# Patient Record
Sex: FEMALE | Race: WHITE | NOT HISPANIC OR LATINO | Employment: OTHER | URBAN - METROPOLITAN AREA
[De-identification: names, ages, dates, MRNs, and addresses within clinical notes are randomized per-mention and may not be internally consistent; named-entity substitution may affect disease eponyms.]

---

## 2017-07-07 ENCOUNTER — ALLSCRIPTS OFFICE VISIT (OUTPATIENT)
Dept: OTHER | Facility: OTHER | Age: 57
End: 2017-07-07

## 2017-07-07 ENCOUNTER — GENERIC CONVERSION - ENCOUNTER (OUTPATIENT)
Dept: OTHER | Facility: OTHER | Age: 57
End: 2017-07-07

## 2017-07-07 DIAGNOSIS — Z12.39 ENCOUNTER FOR OTHER SCREENING FOR MALIGNANT NEOPLASM OF BREAST: ICD-10-CM

## 2017-07-07 DIAGNOSIS — N39.3 STRESS INCONTINENCE: ICD-10-CM

## 2017-08-04 ENCOUNTER — ALLSCRIPTS OFFICE VISIT (OUTPATIENT)
Dept: OTHER | Facility: OTHER | Age: 57
End: 2017-08-04

## 2017-08-04 PROCEDURE — G0145 SCR C/V CYTO,THINLAYER,RESCR: HCPCS | Performed by: NURSE PRACTITIONER

## 2017-08-07 ENCOUNTER — LAB REQUISITION (OUTPATIENT)
Dept: LAB | Facility: HOSPITAL | Age: 57
End: 2017-08-07
Payer: COMMERCIAL

## 2017-08-07 DIAGNOSIS — Z12.4 ENCOUNTER FOR SCREENING FOR MALIGNANT NEOPLASM OF CERVIX: ICD-10-CM

## 2017-08-10 LAB
LAB AP GYN PRIMARY INTERPRETATION: NORMAL
Lab: NORMAL

## 2017-08-11 ENCOUNTER — GENERIC CONVERSION - ENCOUNTER (OUTPATIENT)
Dept: OTHER | Facility: OTHER | Age: 57
End: 2017-08-11

## 2017-09-06 ENCOUNTER — APPOINTMENT (OUTPATIENT)
Dept: PHYSICAL THERAPY | Facility: REHABILITATION | Age: 57
End: 2017-09-06
Payer: COMMERCIAL

## 2017-09-06 ENCOUNTER — GENERIC CONVERSION - ENCOUNTER (OUTPATIENT)
Dept: OTHER | Facility: OTHER | Age: 57
End: 2017-09-06

## 2017-09-06 PROCEDURE — 97530 THERAPEUTIC ACTIVITIES: CPT

## 2017-09-06 PROCEDURE — 97162 PT EVAL MOD COMPLEX 30 MIN: CPT

## 2017-09-19 ENCOUNTER — APPOINTMENT (OUTPATIENT)
Dept: PHYSICAL THERAPY | Facility: REHABILITATION | Age: 57
End: 2017-09-19
Payer: COMMERCIAL

## 2017-09-19 PROCEDURE — 97112 NEUROMUSCULAR REEDUCATION: CPT

## 2017-11-08 ENCOUNTER — GENERIC CONVERSION - ENCOUNTER (OUTPATIENT)
Dept: OTHER | Facility: OTHER | Age: 57
End: 2017-11-08

## 2018-01-12 VITALS
WEIGHT: 148 LBS | DIASTOLIC BLOOD PRESSURE: 82 MMHG | HEIGHT: 68 IN | SYSTOLIC BLOOD PRESSURE: 122 MMHG | BODY MASS INDEX: 22.43 KG/M2

## 2018-01-13 VITALS
SYSTOLIC BLOOD PRESSURE: 106 MMHG | WEIGHT: 149.4 LBS | BODY MASS INDEX: 23.45 KG/M2 | HEIGHT: 67 IN | DIASTOLIC BLOOD PRESSURE: 78 MMHG

## 2018-01-14 VITALS
DIASTOLIC BLOOD PRESSURE: 82 MMHG | WEIGHT: 148.8 LBS | HEIGHT: 68 IN | BODY MASS INDEX: 22.55 KG/M2 | SYSTOLIC BLOOD PRESSURE: 122 MMHG

## 2018-01-16 NOTE — RESULT NOTES
Verified Results  (1) THIN PREP PAP WITH IMAGING [id number] 12:22PM Kerrie Bamberger     Test Name Result Flag Reference   LAB AP CASE REPORT (Report)     Gynecologic Cytology Report            Case: [id number]                  Authorizing Provider: AUDRA Yin   Collected:      08/04/2017 1222        First Screen:     HUGO Melgar    Received:      08/07/2017 1222        Specimen:  LIQUID-BASED PAP, SCREENING, Cervix   LAB AP GYN PRIMARY INTERPRETATION      Negative for intraepithelial lesion or malignancy  Electronically signed by HUGO Melgar on 8/10/2017 at 11:14 AM   LAB AP GYN SPECIMEN ADEQUACY      Satisfactory for evaluation  Endocervical/transformation zone component present  LAB AP GYN ADDITIONAL INFORMATION (Report)     tok tok tok's FDA approved ,  and ThinPrep Imaging System are   utilized with strict adherence to the 's instruction manual to   prepare gynecologic and non-gynecologic cytology specimens for the   production of ThinPrep slides as well as for gynecologic ThinPrep imaging  These processes have been validated by our laboratory and/or by the     The Pap test is not a diagnostic procedure and should not be used as the   sole means to detect cervical cancer  It is only a screening procedure to   aid in the detection of cervical cancer and its precursors  Both   false-negative and false-positive results have been experienced  Your   patient's test result should be interpreted in this context together with   the history and clinical findings     LAB AP LMP not given     not given

## 2018-08-13 ENCOUNTER — ANNUAL EXAM (OUTPATIENT)
Dept: GYNECOLOGY | Facility: CLINIC | Age: 58
End: 2018-08-13
Payer: COMMERCIAL

## 2018-08-13 VITALS
WEIGHT: 146.2 LBS | BODY MASS INDEX: 22.95 KG/M2 | HEART RATE: 62 BPM | HEIGHT: 67 IN | SYSTOLIC BLOOD PRESSURE: 122 MMHG | RESPIRATION RATE: 16 BRPM | DIASTOLIC BLOOD PRESSURE: 84 MMHG

## 2018-08-13 DIAGNOSIS — Z01.411 ENCNTR FOR GYN EXAM (GENERAL) (ROUTINE) W ABNORMAL FINDINGS: Primary | ICD-10-CM

## 2018-08-13 DIAGNOSIS — Z12.31 ENCOUNTER FOR SCREENING MAMMOGRAM FOR BREAST CANCER: ICD-10-CM

## 2018-08-13 DIAGNOSIS — N90.7 VULVAR CYST: ICD-10-CM

## 2018-08-13 DIAGNOSIS — D22.9 NEVUS OF MULTIPLE SITES: ICD-10-CM

## 2018-08-13 PROCEDURE — S0612 ANNUAL GYNECOLOGICAL EXAMINA: HCPCS | Performed by: NURSE PRACTITIONER

## 2018-08-13 RX ORDER — MONTELUKAST SODIUM 10 MG/1
10 TABLET ORAL
COMMUNITY
Start: 2018-07-24 | End: 2019-01-18

## 2018-08-13 RX ORDER — CETIRIZINE HYDROCHLORIDE 10 MG/1
10 TABLET ORAL
COMMUNITY

## 2018-08-13 RX ORDER — BUDESONIDE AND FORMOTEROL FUMARATE DIHYDRATE 160; 4.5 UG/1; UG/1
AEROSOL RESPIRATORY (INHALATION)
COMMUNITY
Start: 2018-07-20 | End: 2020-08-25 | Stop reason: SDUPTHER

## 2018-08-13 RX ORDER — FLUTICASONE PROPIONATE 50 MCG
SPRAY, SUSPENSION (ML) NASAL
COMMUNITY
Start: 2018-07-24 | End: 2019-01-18

## 2018-08-13 NOTE — PROGRESS NOTES
Assessment/Plan:     Calcium 1200-1500mg + 600-1000 IU Vit D daily  Pap with HR HPV q 3 years-due 2020  Annual mammogram Colonoscopy- currently due-will call and schedule  Monthly BSE  Exercise 150 minutes per week minimum  Follow up with Dr Ebonie Burkett for skin check  Call with any worsening of left vulvar cyst  Tub soak with epsom salt daily x 1 week  Warm soaks  Diagnoses and all orders for this visit:    Encntr for gyn exam (general) (routine) w abnormal findings    Vulvar cyst    Encounter for screening mammogram for breast cancer  -     Mammo screening bilateral w 3d & cad; Future    Other orders  -     VENTOLIN  (90 Base) MCG/ACT inhaler;   -     SYMBICORT 160-4 5 MCG/ACT inhaler;   -     cetirizine (ZyrTEC) 10 mg tablet; Take 10 mg by mouth  -     fluticasone (FLONASE) 50 mcg/act nasal spray;   -     montelukast (SINGULAIR) 10 mg tablet; Take 10 mg by mouth              Subjective:      Patient ID: Benson Belle is a 56 y o  female  Benson Belle is a 56 y o  female who is here today for her annual visit  Concerned about a "cyst" that she noticed in her left groin about 2 weeks ago  It has decreased in size over these weeks  Tender with tough and hard  LMP 2 years ago  No vaginal bleeding  Benson Belle is occasionally sexually active with male partner of 28 years  Monogamous  Sex is slightly less comfortable   has HTN and taking a diruretic  Hx of asthma-typically is stable  No meds needed currently  Exercises 2-4 times per week  They just got a new puppy since they lost their dog recently  Attended PF PT which helped with urinary incontinence  Resolved currently  The following portions of the patient's history were reviewed and updated as appropriate: allergies, current medications, past family history, past medical history, past social history, past surgical history and problem list     Review of Systems   Constitutional: Negative    Negative for activity change, appetite change, chills, diaphoresis, fatigue, fever and unexpected weight change  HENT: Negative for congestion, dental problem, sneezing, sore throat and trouble swallowing  Eyes: Negative for visual disturbance  Respiratory: Negative for chest tightness and shortness of breath  Cardiovascular: Negative for chest pain and leg swelling  Gastrointestinal: Negative for abdominal pain, constipation, diarrhea, nausea and vomiting  Genitourinary: Positive for dyspareunia  Negative for difficulty urinating, dysuria, frequency, hematuria, menstrual problem, pelvic pain, urgency, vaginal bleeding, vaginal discharge and vaginal pain  Musculoskeletal: Negative for back pain and neck pain  Skin: Negative  Allergic/Immunologic: Negative  Neurological: Negative for weakness and headaches  Hematological: Negative for adenopathy  Psychiatric/Behavioral: Negative  Objective:      /84 (BP Location: Right arm, Patient Position: Sitting, Cuff Size: Standard)   Pulse 62   Resp 16   Ht 5' 7" (1 702 m)   Wt 66 3 kg (146 lb 3 2 oz)   BMI 22 90 kg/m²          Physical Exam   Constitutional: She is oriented to person, place, and time  She appears well-developed and well-nourished  HENT:   Head: Normocephalic and atraumatic  Eyes: Right eye exhibits no discharge  Left eye exhibits no discharge  Neck: Normal range of motion  Neck supple  Cardiovascular: Normal rate, regular rhythm, normal heart sounds and intact distal pulses  Pulmonary/Chest: Effort normal and breath sounds normal    Abdominal: Soft  Genitourinary: Vagina normal and uterus normal  Rectal exam shows no external hemorrhoid  No breast swelling, tenderness, discharge or bleeding  No labial fusion  There is no rash, tenderness, lesion or injury on the right labia  There is no rash, tenderness, lesion or injury on the left labia  Cervix exhibits no motion tenderness, no discharge and no friability   Right adnexum displays no mass, no tenderness and no fullness  Left adnexum displays no mass, no tenderness and no fullness  No erythema, tenderness or bleeding in the vagina  No foreign body in the vagina  No signs of injury around the vagina  No vaginal discharge found  Genitourinary Comments: Slight vaginal atrophy     Musculoskeletal: Normal range of motion  Lymphadenopathy:     She has no cervical adenopathy  Right: No inguinal adenopathy present  Left: No inguinal adenopathy present  Neurological: She is alert and oriented to person, place, and time  Skin: Skin is warm and dry  Raised 2 tone elevated nevus on right inner thigh  Slighty raised tan nevus on left labia majora  <1 cm firm nodule with slight erythema top of left labia majora   Psychiatric: She has a normal mood and affect  Nursing note and vitals reviewed

## 2018-08-13 NOTE — PATIENT INSTRUCTIONS
Calcium 1200-1500mg + 600-1000 IU Vit D daily  Pap with HR HPV q 3 years-due 2020  Annual mammogram Colonoscopy-done at age 50, currently due-will call and schedule  Monthly BSE  Exercise 150 minutes per week minimum  Kegels 20 times twice daily  Silicone based lubricant with sex  Follow up with Dr Abel Pereira for skin check  Call with any worsening of left vulvar cyst  Tub soak with epsom salt daily x 1 week  Warm soaks

## 2019-01-14 ENCOUNTER — NURSE TRIAGE (OUTPATIENT)
Dept: PHYSICAL THERAPY | Facility: OTHER | Age: 59
End: 2019-01-14

## 2019-01-14 DIAGNOSIS — G89.29 CHRONIC RIGHT-SIDED LOW BACK PAIN WITH RIGHT-SIDED SCIATICA: Primary | ICD-10-CM

## 2019-01-14 DIAGNOSIS — M54.41 CHRONIC RIGHT-SIDED LOW BACK PAIN WITH RIGHT-SIDED SCIATICA: Primary | ICD-10-CM

## 2019-01-14 NOTE — TELEPHONE ENCOUNTER
Reason for Disposition   Is this a chronic condition? Background - Initial Assessment  Clinical complaint: sciatica pain when standing of my right side, down to calf and into foot  Date of onset: about 1 yr ago  Mechanism of injury: unk    Previous Treatment - Previous Treatment  Previous evaluation: none  Current provider: PCP  Diagnostics: none  Previous treatment: none    Protocols used: SL AMB COMPREHENSIVE SPINE PROGRAM PROTOCOL    RN explained that Comprehensive Spine program is physical therapy based program in which patients are scheduled for a consultation  The therapists may offer treatments such as exercises, stretches, posturing, massage or traction  RN also explained that physiatry is physical medicine and rehabilitation in which the physician assistant would conduct a full exam and if needed, order additional tests / place referrals  PMR treats a wide variety of medical conditions affecting the brain, spinal cord, nerves, bones, joints, ligaments, muscles, and tendons  RN recommended Physical therapy and Physiatry consults for which the pt agreed  RN submitted referrals for PTx Guero Kennedy) and PM&R(REBECCA Brooks)  RN provided both therapy  and [address] [place] [address] Road with pt contact info

## 2019-01-15 NOTE — PROGRESS NOTES
Assessment/Plan:      Diagnoses and all orders for this visit:    Chronic right-sided low back pain with right-sided sciatica  -     XR spine lumbar minimum 4 views non injury; Future    Piriformis syndrome of right side    Pain of right sacroiliac joint    Other orders  -     fluticasone (FLONASE) 50 mcg/act nasal spray; instill 1 spray into each nostril daily  -     albuterol (VENTOLIN HFA) 90 mcg/act inhaler; Inhale 2 puffs  -     Ascorbic Acid (VITAMIN C) 1000 MG tablet; Take 1,000 mg by mouth daily  -     Cholecalciferol (VITAMIN D3) 2000 units capsule; Take 2,000 Units by mouth daily  -     Flaxseed, Linseed, (FLAX SEEDS PO); Take by mouth  -     calcium carbonate (OS-CONCHITA) 600 MG tablet; Take 600 mg by mouth 2 (two) times a day with meals      Discussion: 67 yo female presenting for chronic LBP with sciatica  Will order XR of lumbar spine as she does have a hx of osteopenia as well as lumbar facet margin tenderness  Advised to start physical therapy when able as ordered by Comprehensive Spine Program focusing on SIJ and piriformis mobilization  Will follow up with patient after completion of therapy for re-evaluation  Discussed if no relief from therapy to consider further imaging or possible referral for injection therapy  Recommend she continue OTC NSAIDs with food as needed for pain relief in addition to heat, and OTC topicals  Subjective:     Patient ID: Kayden Martinez is a 66 y o  female  HPI Referral from [facility] Program for chronic right sided sciatica  Denies any previous imaging or treatments  LBP and leg pain for the past few years  Describes as a constant aching pain with radiation into lateral calf in an L5 distribution  Sciatica exacerbated with standing, but is a constant issue  Denies any N/T, weakness, B/B incontinence/retention, saddle anesthesia  Pain is not as significant with walking  Using BenGay with temporary  Utilizing Advil as needed for relief  Lanette Johnston RN    1/14/19 11:57 AM   Note      Reason for Disposition   Is this a chronic condition? Background - Initial Assessment  Clinical complaint: sciatica pain when standing of my right side, down to calf and into foot  Date of onset: about 1 yr ago  Mechanism of injury: unk    Previous Treatment - Previous Treatment  Previous evaluation: none  Current provider: PCP  Diagnostics: none  Previous treatment: none    Protocols used: SL AMB COMPREHENSIVE SPINE PROGRAM PROTOCOL     RN explained that Comprehensive Spine program is physical therapy based program in which patients are scheduled for a consultation  The therapists may offer treatments such as exercises, stretches, posturing, massage or traction        RN also explained that physiatry is physical medicine and rehabilitation in which the physician assistant would conduct a full exam and if needed, order additional tests / place referrals  PMR treats a wide variety of medical conditions affecting the brain, spinal cord, nerves, bones, joints, ligaments, muscles, and tendons        RN recommended Physical therapy and Physiatry consults for which the pt agreed       RN submitted referrals for PTx Raghu Arnold) and PM&R(REBECCA HENDRICKS)       RN provided both therapy  and [address] [place] [address] with pt contact info  PAST MEDICAL HISTORY  Past Medical History:   Diagnosis Date    Asthma      PAST SURGICAL HISTORY  Past Surgical History:   Procedure Laterality Date    WISDOM TOOTH EXTRACTION         FAMILY HISTORY  Family History   Problem Relation Age of Onset    Adopted:  Yes    No Known Problems Son     No Known Problems Son      HOME MEDICATIONS  Current Outpatient Prescriptions   Medication Sig Dispense Refill    albuterol (VENTOLIN HFA) 90 mcg/act inhaler Inhale 2 puffs      Ascorbic Acid (VITAMIN C) 1000 MG tablet Take 1,000 mg by mouth daily      calcium carbonate (OS-CONCHITA) 600 MG tablet Take 600 mg by mouth 2 (two) times a day with meals      cetirizine (ZyrTEC) 10 mg tablet Take 10 mg by mouth      Cholecalciferol (VITAMIN D3) 2000 units capsule Take 2,000 Units by mouth daily      Flaxseed, Linseed, (FLAX SEEDS PO) Take by mouth      fluticasone (FLONASE) 50 mcg/act nasal spray instill 1 spray into each nostril daily      SYMBICORT 160-4 5 MCG/ACT inhaler        No current facility-administered medications for this visit  ALLERGIES  Eggs or egg-derived products; Paroxetine; and Tetracycline      SOCIAL HISTORY  History   Alcohol Use    Yes     Comment: Occasionally     History   Drug Use No     History   Smoking Status    Never Smoker   Smokeless Tobacco    Never Used     Review of Systems   Constitutional: Negative for chills, diaphoresis, fever and unexpected weight change  Respiratory: Positive for shortness of breath  Negative for wheezing  Cardiovascular: Negative for chest pain and palpitations  Gastrointestinal: Negative for constipation  Genitourinary: Negative for difficulty urinating  Musculoskeletal: Positive for back pain  Neurological: Negative for weakness and numbness  Psychiatric/Behavioral: Positive for sleep disturbance  Objective:  Vitals:    01/18/19 1403   BP: 116/88   Pulse: 64        Physical Exam   Constitutional: She is oriented to person, place, and time  She appears well-developed and well-nourished  No distress  HENT:   Head: Normocephalic and atraumatic  Neck: Normal range of motion  Neck supple  Musculoskeletal:        Lumbar back: She exhibits decreased range of motion, tenderness and bony tenderness  Significant TTP right SIJ and piriformis     Piriformis palpation right with increased left calf sensation    Mild TTP lumbar facet margins, paraspinals, ITB    Noted hip flexor tightness  + Romeo's right into hip    Neurological: She is alert and oriented to person, place, and time  She has normal strength  She displays no atrophy   No cranial nerve deficit or sensory deficit  She exhibits normal muscle tone  Coordination and gait normal    Reflex Scores:       Tricep reflexes are 2+ on the right side and 2+ on the left side  Bicep reflexes are 2+ on the right side and 2+ on the left side  Brachioradialis reflexes are 2+ on the right side and 2+ on the left side  Patellar reflexes are 2+ on the right side and 2+ on the left side  Achilles reflexes are 2+ on the right side and 2+ on the left side   - SLR bilaterally     Able to heel walk, toe walk, knee bend without difficulty   Skin: Skin is warm and dry  No rash noted  She is not diaphoretic  Psychiatric: She has a normal mood and affect  Her behavior is normal  Judgment and thought content normal    Vitals reviewed

## 2019-01-18 ENCOUNTER — OFFICE VISIT (OUTPATIENT)
Dept: PAIN MEDICINE | Facility: CLINIC | Age: 59
End: 2019-01-18
Payer: COMMERCIAL

## 2019-01-18 VITALS
HEART RATE: 64 BPM | BODY MASS INDEX: 25.43 KG/M2 | WEIGHT: 162 LBS | HEIGHT: 67 IN | SYSTOLIC BLOOD PRESSURE: 116 MMHG | DIASTOLIC BLOOD PRESSURE: 88 MMHG

## 2019-01-18 DIAGNOSIS — M54.41 CHRONIC RIGHT-SIDED LOW BACK PAIN WITH RIGHT-SIDED SCIATICA: Primary | ICD-10-CM

## 2019-01-18 DIAGNOSIS — M53.3 PAIN OF RIGHT SACROILIAC JOINT: ICD-10-CM

## 2019-01-18 DIAGNOSIS — G89.29 CHRONIC RIGHT-SIDED LOW BACK PAIN WITH RIGHT-SIDED SCIATICA: Primary | ICD-10-CM

## 2019-01-18 DIAGNOSIS — G57.01 PIRIFORMIS SYNDROME OF RIGHT SIDE: ICD-10-CM

## 2019-01-18 PROCEDURE — 99204 OFFICE O/P NEW MOD 45 MIN: CPT | Performed by: PHYSICIAN ASSISTANT

## 2019-01-18 RX ORDER — PHENOL 1.4 %
600 AEROSOL, SPRAY (ML) MUCOUS MEMBRANE 2 TIMES DAILY WITH MEALS
COMMUNITY

## 2019-01-18 RX ORDER — ACETAMINOPHEN 160 MG
2000 TABLET,DISINTEGRATING ORAL DAILY
COMMUNITY

## 2019-01-18 RX ORDER — ALBUTEROL SULFATE 90 UG/1
2 AEROSOL, METERED RESPIRATORY (INHALATION)
COMMUNITY
Start: 2018-11-13 | End: 2019-11-13

## 2019-01-18 RX ORDER — MULTIVIT WITH MINERALS/LUTEIN
1000 TABLET ORAL DAILY
COMMUNITY

## 2019-01-18 RX ORDER — FLUTICASONE PROPIONATE 50 MCG
SPRAY, SUSPENSION (ML) NASAL
COMMUNITY
Start: 2018-10-29 | End: 2020-08-25 | Stop reason: DRUGHIGH

## 2019-01-28 ENCOUNTER — EVALUATION (OUTPATIENT)
Dept: PHYSICAL THERAPY | Age: 59
End: 2019-01-28
Payer: COMMERCIAL

## 2019-01-28 DIAGNOSIS — M54.41 CHRONIC RIGHT-SIDED LOW BACK PAIN WITH RIGHT-SIDED SCIATICA: Primary | ICD-10-CM

## 2019-01-28 DIAGNOSIS — G89.29 CHRONIC RIGHT-SIDED LOW BACK PAIN WITH RIGHT-SIDED SCIATICA: Primary | ICD-10-CM

## 2019-01-28 PROCEDURE — 97161 PT EVAL LOW COMPLEX 20 MIN: CPT | Performed by: PHYSICAL THERAPIST

## 2019-01-28 PROCEDURE — 97140 MANUAL THERAPY 1/> REGIONS: CPT | Performed by: PHYSICAL THERAPIST

## 2019-01-28 PROCEDURE — 97110 THERAPEUTIC EXERCISES: CPT | Performed by: PHYSICAL THERAPIST

## 2019-01-28 NOTE — PROGRESS NOTES
PT Evaluation     Today's date: 2019  Patient name: Sallie Whitmore  : 1960  MRN: [id number]  Referring provider: Joseph Segundo MD  Dx:   Encounter Diagnosis     ICD-10-CM    1  Chronic right-sided low back pain with right-sided sciatica M54 41     G89 29        Start Time: 1610  Stop Time: 1700  Total time in clinic (min): 50 minutes    Assessment  Assessment details: Sallie Whitmore is a 66 y o  female who presents with complaints of Chronic right-sided low back pain with right-sided sciatica  (primary encounter diagnosis)  No further referral appears necessary at this time based upon examination results  She is presenting with core weakness, decreased gluteal strength, SI joint dysfunction leading to nerve irritation  She is limited with sitting and standing for durations and will benefit from treatment to address functional deficits  Prognosis is good given HEP compliance and PT 2-3x/wk  Positive prognostic indicators include positive attitude toward recovery  Please contact me if you have any questions or recommendations  Thank you for the opportunity to share in  [facility]       Impairments: abnormal muscle firing, abnormal muscle tone, abnormal or restricted ROM, abnormal movement, impaired physical strength, lacks appropriate home exercise program, pain with function and poor posture     Symptom irritability: lowUnderstanding of Dx/Px/POC: good   Prognosis: good    Plan  Patient would benefit from: skilled physical therapy  Planned modality interventions: thermotherapy: hydrocollator packs  Planned therapy interventions: joint mobilization, manual therapy, patient education, postural training, strengthening, stretching, therapeutic activities, therapeutic exercise, home exercise program, graded motor, graded exercise, neuromuscular re-education, abdominal trunk stabilization and functional ROM exercises  Frequency: 2x week  Duration in weeks: 5  Plan of Care beginning date: 2019  Plan of Care expiration date: 3/4/2019  Treatment plan discussed with: patient        Subjective Evaluation    History of Present Illness  Mechanism of injury: Patient reports chronic symptoms that come and go at times and has been present for a couple months  Symptoms have been worsening at this time and standing for long periods of time have been worsening  Reports prior history or Right sided sciatic symptoms  Sitting will improve symptoms if present once standing  Symptoms start at right PSIS into right gluteal down posterior aspect of right calf  Does not extend into foot  Symptoms described as tight and achy, no sharp shooting pain, nor numbness/tingling  No bowel/bladder dysfunction, nor saddle paraesthesias, nor cough/sneeze provocation  She reports that some vehicles will bother her while driving  Reports that she feels worse when driving compared to sitting as passenger  Occasional discomfort when rolling over in bed as well as with transitioning into and out of car  Denies pain with stair navigation nor with rising from seated position  5-10 minutes comfortably standing before needing to sit down  Recurrent probem    Quality of life: good    Pain  Current pain rating: 3  At best pain ratin  At worst pain ratin  Quality: dull ache  Aggravating factors: standing and walking    Treatments  No previous or current treatments  Patient Goals  Patient goals for therapy: independence with ADLs/IADLs and decreased pain  Patient goal: improve standing/walking/sitting tolerance        Objective    Flowsheet Rows      Most Recent Value   PT/OT G-Codes   Current Score  65   Projected Score  74       GAIT: unremarkable  Squat assess: slight left lean  ¼ squat assess:B/L genu valgum    Lumbar  % of normal   Flex  75%p! Extn  75%p! SB Left 100%   SB Right 75%p! ROT Left 100%   ROT Right 100%   Repetitive testing: extension= better    Flexion= no change      MMT    Hip       L       R   Flex   5 5 Extn  5 4   Abd  5 4   Add  5 5   IR  5 4   ER  4 4-        G  Max 4- 3+   G  Med  3+ 3-                 MMT    Knee         L        R   Flex  5 5   Extn  5 5                     MMT    Ankle       L        R   PF 5 5   DF  5 5   EHL 5 5   Inv  5 5     Palpation: TTP sacrotuberous lig , piriformis, sciatic nerve  Myotomes (L/R): intact  Dermatome: (pinprick- L/R):  intact     Reflexes:  (L/R) L4: 2+ BLE       S1: 2+ BLE            Babinski=   -       Slump test: L= -    R=    Pain in back with neck flexion   Straight leg raise:   L=   -   R=   +             Transverse abdominis: Bent knee fall out= Fair supine march=Poor       Hip/SI joint:   scour=  -     juarez = -    capsular mobility=  -       long axis distraction (hip) =  -       obers=   -       gurjit test= -       Active SLR=   -        Active SLR with stabilization =  -     Leg length =  -    Sacral Thrust=  + compression=  +   [place] scan=   + 4/4  FAIR: -   Innominate position= right anterior rotated  Hamstring dominance test=    +      Hip abd/lat rot  = +        prone knee flexion= -  Multifidus activation = fair  Joint mobility: decreased L4-5          Sacral position: unremarkable    Precautions: none    Daily Treatment Diary   Manual 1/28       Gr  5 right AI correction FB                                                         Exercise Diary 1/28       Stand glute set with hip ER 2x10, 5"       R SKTC 10x5"       R clam shell 2x10, 5"       TA BKFO 2x10                                                 Patient provided verbal consent to treatment plan and recommended interventions  Modalities                          Short Term:  1  Pt will report decreased levels of pain by at least 2 subjective ratings in 4 weeks  2  Pt will demonstrate improved ROM by at least 10 degrees in 4 weeks  3  Pt will demonstrate improved strength by 1/2 grade in 4 weeks  4  Pt will be able to stand > 20 minutes in 4 weeks  Long Term:   1   Pt will be independent in their HEP in 8 weeks  2  Pt will be pain free with ADLs  3  Pt will demonstrate improved FOTO, > 9 in 8 weeks  4  Pt will be able to sit > 1 hour without pain in 8 weeks

## 2019-01-30 ENCOUNTER — APPOINTMENT (OUTPATIENT)
Dept: PHYSICAL THERAPY | Age: 59
End: 2019-01-30
Payer: COMMERCIAL

## 2019-02-04 ENCOUNTER — OFFICE VISIT (OUTPATIENT)
Dept: PHYSICAL THERAPY | Age: 59
End: 2019-02-04
Payer: COMMERCIAL

## 2019-02-04 ENCOUNTER — APPOINTMENT (OUTPATIENT)
Dept: PHYSICAL THERAPY | Age: 59
End: 2019-02-04
Payer: COMMERCIAL

## 2019-02-04 DIAGNOSIS — M54.41 CHRONIC RIGHT-SIDED LOW BACK PAIN WITH RIGHT-SIDED SCIATICA: Primary | ICD-10-CM

## 2019-02-04 DIAGNOSIS — G89.29 CHRONIC RIGHT-SIDED LOW BACK PAIN WITH RIGHT-SIDED SCIATICA: Primary | ICD-10-CM

## 2019-02-04 PROCEDURE — 97112 NEUROMUSCULAR REEDUCATION: CPT | Performed by: PHYSICAL THERAPIST

## 2019-02-04 PROCEDURE — 97140 MANUAL THERAPY 1/> REGIONS: CPT | Performed by: PHYSICAL THERAPIST

## 2019-02-04 PROCEDURE — 97110 THERAPEUTIC EXERCISES: CPT | Performed by: PHYSICAL THERAPIST

## 2019-02-04 NOTE — PROGRESS NOTES
Daily Note     Today's date: 2019  Patient name: Maya Bullock  : 1960  MRN: [id number]  Referring provider: Jyoti Kimbrough MD  Dx:   Encounter Diagnosis     ICD-10-CM    1  Chronic right-sided low back pain with right-sided sciatica M54 41     G89 29        Start Time: 1715  Stop Time: 1805  Total time in clinic (min): 50 minutes    Subjective: Patient reports that she has been feeling much better overall  Still having discomfort in gluteal region and down her leg but has noticed a difference since initial evaluation  Objective: See treatment diary below      Assessment: Tolerated treatment well  Patient demonstrated improved gluteal activation and improved neural mobility  Plan: Continue per plan of care       Precautions: none     Daily Treatment Diary   Manual  2/4         Gr  5 right AI correction FB  np          L5-S1 gapping with sciatic n  tensioners   FB          Right Sciatic n  tensioners (DF/PF)   3x10                                                                    Exercise Diary   2/4         Stand glute set with hip ER 2x10, 5"  HEP         R SKTC 10x5"  HEP         R clam shell 2x10, 5"  2x10, 5"         TA BKFO 2x10  2x10, with cuff          TA iso    10x10" with cuff          prone heel squeeze   5" hold, 2 min  total          prone press up    2x10          lateal heel tap  1x10, 4"          sciatic tensioners PF/DF seated   2x10

## 2019-02-05 ENCOUNTER — APPOINTMENT (OUTPATIENT)
Dept: PHYSICAL THERAPY | Age: 59
End: 2019-02-05
Payer: COMMERCIAL

## 2019-02-07 ENCOUNTER — APPOINTMENT (OUTPATIENT)
Dept: PHYSICAL THERAPY | Age: 59
End: 2019-02-07
Payer: COMMERCIAL

## 2019-08-15 ENCOUNTER — ANNUAL EXAM (OUTPATIENT)
Dept: GYNECOLOGY | Facility: CLINIC | Age: 59
End: 2019-08-15
Payer: COMMERCIAL

## 2019-08-15 VITALS
WEIGHT: 146.4 LBS | SYSTOLIC BLOOD PRESSURE: 124 MMHG | DIASTOLIC BLOOD PRESSURE: 78 MMHG | HEIGHT: 67 IN | BODY MASS INDEX: 22.98 KG/M2

## 2019-08-15 DIAGNOSIS — N95.2 VAGINAL ATROPHY: ICD-10-CM

## 2019-08-15 DIAGNOSIS — Z01.411 ENCNTR FOR GYN EXAM (GENERAL) (ROUTINE) W ABNORMAL FINDINGS: Primary | ICD-10-CM

## 2019-08-15 DIAGNOSIS — Z12.31 ENCOUNTER FOR SCREENING MAMMOGRAM FOR BREAST CANCER: ICD-10-CM

## 2019-08-15 PROCEDURE — S0612 ANNUAL GYNECOLOGICAL EXAMINA: HCPCS | Performed by: NURSE PRACTITIONER

## 2019-08-15 RX ORDER — LEVOTHYROXINE SODIUM 0.03 MG/1
TABLET ORAL
Refills: 0 | COMMUNITY
Start: 2019-08-10 | End: 2020-08-25 | Stop reason: SDUPTHER

## 2019-08-15 RX ORDER — ESTRADIOL 0.1 MG/G
CREAM VAGINAL
Qty: 42.5 G | Refills: 1 | Status: SHIPPED | OUTPATIENT
Start: 2019-08-15 | End: 2019-08-21

## 2019-08-15 NOTE — PATIENT INSTRUCTIONS
Calcium 1200-1500mg + 600-1000 IU Vit D daily  Pap with HR HPV Q 5 years-due 2020  Annual mammogram  Colonoscopy-up to date  Monthly BSE  Exercise 150 minutes per week minimum  Kegels 20 times twice daily  Silicone based lubricant with sex  Vaginal estrogen as discussed  RTO in 6 months for follow up

## 2019-08-15 NOTE — PROGRESS NOTES
Assessment/Plan:     Calcium 1200-1500mg + 600-1000 IU Vit D daily  Pap with HR HPV Q 5 years-due 2020  Annual mammogram  Colonoscopy-up to date  Monthly BSE  Exercise 150 minutes per week minimum  Kegels 20 times twice daily  Silicone based lubricant with sex  Vaginal estrogen as discussed  RTO in 6 months for follow up  Negative depression screen  Diagnoses and all orders for this visit:    Encntr for gyn exam (general) (routine) w abnormal findings    Vaginal atrophy  -     estradiol (ESTRACE) 0 1 mg/g vaginal cream; Use 0 5 gm daily x 2 weeks then 0 5 gm twice weekly    Encounter for screening mammogram for breast cancer  -     Mammo screening bilateral w 3d & cad; Future    Other orders  -     levothyroxine 25 mcg tablet              Subjective:      Patient ID: Esme Hope is a 66 y o  female  Esme Hope is a 66 y o  female who is here today for her annual visit  No health concerns  LMP 3 years ago  No vaginal bleeding  Esme Hope is not sexually active with male partner of 29 years  Last coitus was uncomfortable  Normal pap 8/17  Has a new grandson born in 4/19  Another baby due 11/19  All living local  Started on levothyroxine last year for hypothyroidism  Exercising 4 times per week  Asthma is stable  The following portions of the patient's history were reviewed and updated as appropriate: allergies, current medications, past family history, past medical history, past social history, past surgical history and problem list     Review of Systems   Constitutional: Negative  Negative for activity change, appetite change, chills, diaphoresis, fatigue, fever and unexpected weight change  HENT: Negative for congestion, dental problem, sneezing, sore throat and trouble swallowing  Eyes: Negative for visual disturbance  Respiratory: Negative for chest tightness and shortness of breath  Cardiovascular: Negative for chest pain and leg swelling     Gastrointestinal: Negative for abdominal pain, constipation, diarrhea, nausea and vomiting  Genitourinary: Positive for dyspareunia  Negative for difficulty urinating, dysuria, frequency, hematuria, pelvic pain, urgency, vaginal bleeding, vaginal discharge and vaginal pain  Musculoskeletal: Negative for back pain and neck pain  Skin: Negative  Allergic/Immunologic: Negative  Neurological: Negative for weakness and headaches  Hematological: Negative for adenopathy  Psychiatric/Behavioral: Negative  Objective:      /78 (BP Location: Left arm, Patient Position: Sitting, Cuff Size: Standard)   Ht 5' 7" (1 702 m)   Wt 66 4 kg (146 lb 6 4 oz)   LMP  (LMP Unknown)   BMI 22 93 kg/m²          Physical Exam   Constitutional: She is oriented to person, place, and time  Vital signs are normal  She appears well-developed and well-nourished  HENT:   Head: Normocephalic and atraumatic  Eyes: Right eye exhibits no discharge  Left eye exhibits no discharge  Neck: Trachea normal and normal range of motion  Neck supple  No thyromegaly present  Cardiovascular: Normal rate, regular rhythm, normal heart sounds and intact distal pulses  Pulmonary/Chest: Effort normal and breath sounds normal  Right breast exhibits no inverted nipple, no mass, no nipple discharge, no skin change and no tenderness  Left breast exhibits no inverted nipple, no mass, no nipple discharge, no skin change and no tenderness  No breast tenderness, discharge or bleeding  Breasts are symmetrical    Abdominal: Soft  Normal appearance  Genitourinary: Vagina normal and uterus normal  Rectal exam shows no external hemorrhoid  No breast tenderness, discharge or bleeding  Pelvic exam was performed with patient supine  No labial fusion  There is no rash, tenderness, lesion or injury on the right labia  There is no rash, tenderness, lesion or injury on the left labia  Cervix exhibits no motion tenderness, no discharge and no friability   Right adnexum displays no mass, no tenderness and no fullness  Left adnexum displays no mass, no tenderness and no fullness  No erythema, tenderness or bleeding in the vagina  No foreign body in the vagina  No signs of injury around the vagina  No vaginal discharge found  Genitourinary Comments: Slighty raised tan nevus on left labia majora  Vulvovaginal atrophy   Musculoskeletal: Normal range of motion  Lymphadenopathy:        Head (right side): No submental, no submandibular and no tonsillar adenopathy present  Head (left side): No submental, no submandibular and no tonsillar adenopathy present  She has no cervical adenopathy  She has no axillary adenopathy  No inguinal adenopathy noted on the right or left side  Right: No inguinal adenopathy present  Left: No inguinal adenopathy present  Neurological: She is alert and oriented to person, place, and time  Skin: Skin is warm and dry  Psychiatric: She has a normal mood and affect  Nursing note and vitals reviewed

## 2019-08-20 ENCOUNTER — TELEPHONE (OUTPATIENT)
Dept: GYNECOLOGY | Facility: CLINIC | Age: 59
End: 2019-08-20

## 2019-08-20 NOTE — TELEPHONE ENCOUNTER
Tressa patient- pharmacy called stating estrace name brand and generic are off the market right now and now sure when it will be available from [facility] aid  Pharmacist wanted to know if there was something else you wanted to substitute for it? Patient is aware, let her know before calling us

## 2019-08-21 DIAGNOSIS — N95.2 VAGINAL ATROPHY: Primary | ICD-10-CM

## 2019-08-21 RX ORDER — ESTRADIOL 10 UG/1
1 INSERT VAGINAL 2 TIMES WEEKLY
Qty: 26 TABLET | Refills: 3 | Status: SHIPPED | OUTPATIENT
Start: 2019-08-22 | End: 2019-08-27 | Stop reason: SDUPTHER

## 2019-08-21 NOTE — TELEPHONE ENCOUNTER
P- AM LM  Will prescribe Yuvafem - I checked before ordering an it is on the formulary and considered prefered

## 2019-08-27 ENCOUNTER — TELEPHONE (OUTPATIENT)
Dept: GYNECOLOGY | Facility: CLINIC | Age: 59
End: 2019-08-27

## 2019-08-27 DIAGNOSIS — N95.2 VAGINAL ATROPHY: ICD-10-CM

## 2019-08-27 RX ORDER — ESTRADIOL 10 UG/1
INSERT VAGINAL
Qty: 18 TABLET | Refills: 0 | Status: SHIPPED | OUTPATIENT
Start: 2019-08-27 | End: 2020-09-14

## 2019-08-27 NOTE — TELEPHONE ENCOUNTER
Patient wanted to discuss use of yuvafem vs estrace  Yuvafem covered under her insurance  rx sen to pharmacy for first month

## 2019-10-28 ENCOUNTER — HOSPITAL ENCOUNTER (OUTPATIENT)
Dept: RADIOLOGY | Age: 59
Discharge: HOME | End: 2019-10-28
Payer: COMMERCIAL

## 2019-10-28 VITALS — BODY MASS INDEX: 22.76 KG/M2 | WEIGHT: 145 LBS | HEIGHT: 67 IN

## 2019-10-28 DIAGNOSIS — Z12.31 ENCOUNTER FOR SCREENING MAMMOGRAM FOR BREAST CANCER: ICD-10-CM

## 2019-10-28 PROCEDURE — 77067 SCR MAMMO BI INCL CAD: CPT

## 2019-10-28 PROCEDURE — 77063 BREAST TOMOSYNTHESIS BI: CPT

## 2020-01-21 ENCOUNTER — HOSPITAL ENCOUNTER (OUTPATIENT)
Dept: RADIOLOGY | Age: 60
Discharge: HOME | End: 2020-01-21
Payer: COMMERCIAL

## 2020-01-21 DIAGNOSIS — E03.9 HYPOTHYROIDISM, UNSPECIFIED TYPE: ICD-10-CM

## 2020-01-21 DIAGNOSIS — R09.89 GLOBUS SENSATION: ICD-10-CM

## 2020-01-21 PROCEDURE — 76536 US EXAM OF HEAD AND NECK: CPT

## 2020-09-14 ENCOUNTER — ANNUAL EXAM (OUTPATIENT)
Dept: OBGYN CLINIC | Facility: CLINIC | Age: 60
End: 2020-09-14
Payer: COMMERCIAL

## 2020-09-14 VITALS
TEMPERATURE: 97.8 F | SYSTOLIC BLOOD PRESSURE: 116 MMHG | BODY MASS INDEX: 22.6 KG/M2 | DIASTOLIC BLOOD PRESSURE: 64 MMHG | HEIGHT: 67 IN | WEIGHT: 144 LBS

## 2020-09-14 DIAGNOSIS — Z01.419 WELL FEMALE EXAM WITH ROUTINE GYNECOLOGICAL EXAM: Primary | ICD-10-CM

## 2020-09-14 DIAGNOSIS — Z12.4 ENCOUNTER FOR SCREENING FOR CERVICAL CANCER: ICD-10-CM

## 2020-09-14 PROBLEM — N39.3 SUI (STRESS URINARY INCONTINENCE, FEMALE): Status: ACTIVE | Noted: 2017-07-07

## 2020-09-14 PROBLEM — J45.20 MILD INTERMITTENT ASTHMA WITHOUT COMPLICATION: Status: ACTIVE | Noted: 2017-09-12

## 2020-09-14 PROBLEM — Z80.41 FAMILY HISTORY OF OVARIAN CANCER: Status: ACTIVE | Noted: 2020-09-14

## 2020-09-14 PROCEDURE — S0612 ANNUAL GYNECOLOGICAL EXAMINA: HCPCS | Performed by: OBSTETRICS & GYNECOLOGY

## 2020-09-14 PROCEDURE — 87624 HPV HI-RISK TYP POOLED RSLT: CPT | Performed by: OBSTETRICS & GYNECOLOGY

## 2020-09-14 PROCEDURE — G0145 SCR C/V CYTO,THINLAYER,RESCR: HCPCS | Performed by: OBSTETRICS & GYNECOLOGY

## 2020-09-14 NOTE — PROGRESS NOTES
ASSESSMENT & PLAN:   Diagnoses and all orders for this visit:    Well female exam with routine gynecological exam  Encounter for screening for cervical cancer   -     Liquid-based pap, screening      The following were reviewed in today's visit: Current ASCCP Guidelines, breast self exam, menopause, adequate intake of calcium and vitamin D, exercise and healthy diet  Patient to return to office yearly for annual exam      All questions have been answered to her satisfaction  CC:  Annual Gynecologic Examination    HPI: Josue Delgadillo is a 64 y o  [id number] who presents for annual gynecologic examination  She has the following concerns:  None  She is adopted and has never known her history  She found out her biological mother  from ovarian cancer at age [de-identified] yo  Health Maintenance:    She exercises 4 days per week  She wears her seatbelt routinely  She does perform regular monthly self breast exams  Patient tries to follow a balanced diet  Last mammogram:  [facility]  Last colonoscopy: due        Past Medical History:   Diagnosis Date    Allergic rhinitis     Anxiety     Asthma     Disease of thyroid gland     Ear problems     Hyperlipemia        Past Surgical History:   Procedure Laterality Date    WISDOM TOOTH EXTRACTION         Past OB/Gyn History:   No LMP recorded (lmp unknown)  Patient is postmenopausal       Patient is post menopausal    History of sexually transmitted infection: No  Patient is not often sexually active      Birth control: postmenopausal  Last Pap 2017 NILM    Family History   Adopted: Yes   Problem Relation Age of Onset    No Known Problems Son     No Known Problems Son     Ovarian cancer Mother        Social History:  Social History     Socioeconomic History    Marital status: /Civil Union     Spouse name: Not on file    Number of children: 2    Years of education: Not on file    Highest education level: Not on file   Occupational History    Not on file   Social Needs    Financial resource strain: Not on file    Food insecurity     Worry: Not on file     Inability: Not on file    Transportation needs     Medical: Not on file     Non-medical: Not on file   Tobacco Use    Smoking status: Never Smoker    Smokeless tobacco: Never Used   Substance and Sexual Activity    Alcohol use: Yes     Alcohol/week: 8 0 standard drinks     Types: 8 Glasses of wine per week     Comment: social    Drug use: No    Sexual activity: Not Currently     Partners: Male     Birth control/protection: None     Comment: x 29 years   Lifestyle    Physical activity     Days per week: Not on file     Minutes per session: Not on file    Stress: Not on file   Relationships    Social connections     Talks on phone: Not on file     Gets together: Not on file     Attends Evangelical service: Not on file     Active member of club or organization: Not on file     Attends meetings of clubs or organizations: Not on file     Relationship status: Not on file    Intimate partner violence     Fear of current or ex partner: Not on file     Emotionally abused: Not on file     Physically abused: Not on file     Forced sexual activity: Not on file   Other Topics Concern    Not on file   Social History Narrative    Daily caffeine consumption, 2-3 servings    Exercises 3-4 times per week     Presently lives with   She feels safe at home  Patient is currently employed      Allergies   Allergen Reactions    Eggs Or Egg-Derived Products     Other      Seasonal    Paroxetine      TROUBLE SLEEPING     Tetracycline          Current Outpatient Medications:     Ascorbic Acid (VITAMIN C) 1000 MG tablet, Take 1,000 mg by mouth daily, Disp: , Rfl:     atorvastatin (LIPITOR) 10 mg tablet, Take 10 mg by mouth daily, Disp: , Rfl:     budesonide-formoterol (Symbicort) 160-4 5 mcg/act inhaler, Inhale 2 puffs 2 (two) times a day, Disp: , Rfl:     calcium carbonate (OS-CONCHITA) 600 MG tablet, Take 600 mg by mouth 2 (two) times a day with meals, Disp: , Rfl:     cetirizine (ZyrTEC) 10 mg tablet, Take 10 mg by mouth, Disp: , Rfl:     Cholecalciferol (VITAMIN D3) 2000 units capsule, Take 2,000 Units by mouth daily, Disp: , Rfl:     fluticasone (FLONASE) 50 mcg/act nasal spray, 2 sprays into each nostril daily, Disp: 16 g, Rfl: 6    levothyroxine 25 mcg tablet, Take 25 mcg by mouth daily, Disp: , Rfl:     Ventolin  (90 Base) MCG/ACT inhaler, inhale 2 puffs by mouth and INTO THE LUNGS every 6 hours if needed for wheezing, Disp: , Rfl:     Review of Systems:  Review of Systems   Constitutional: Negative for chills, fever and unexpected weight change  Respiratory: Negative for cough and shortness of breath  Cardiovascular: Negative for chest pain and palpitations  Gastrointestinal: Negative for abdominal distention, abdominal pain, blood in stool, constipation, nausea and vomiting  Genitourinary: Positive for dyspareunia  Negative for difficulty urinating, dysuria, flank pain, frequency, genital sores, hematuria, pelvic pain, urgency, vaginal bleeding, vaginal discharge and vaginal pain  Neurological: Negative for headaches  Physical Exam:  /64 (BP Location: Right arm, Patient Position: Sitting, Cuff Size: Standard)   Temp 97 8 °F (36 6 °C)   Ht 5' 7" (1 702 m)   Wt 65 3 kg (144 lb)   LMP  (LMP Unknown)   BMI 22 55 kg/m²    Physical Exam  Constitutional:       General: She is awake  Appearance: Normal appearance  She is well-developed  Genitourinary:      Pelvic exam was performed with patient in the lithotomy position  Vulva, urethra, bladder, vagina and uterus normal       No vulval lesion, ulcerations or rash noted  No urethral prolapse present  Bladder is not distended or tender  Vaginal atrophy present  No vaginal discharge, erythema, tenderness, bleeding or prolapse  Cervix is parous        No cervical motion tenderness, discharge, lesion or polyp  Uterus is mobile  Uterus is not enlarged, tender or irregular  No uterine mass detected  Uterus is regular  No right or left adnexal mass present  Right adnexa not tender or full  Left adnexa not tender or full  HENT:      Head: Normocephalic and atraumatic  Neck:      Musculoskeletal: Neck supple  Cardiovascular:      Rate and Rhythm: Normal rate and regular rhythm  Heart sounds: Normal heart sounds  Pulmonary:      Effort: Pulmonary effort is normal  No tachypnea or respiratory distress  Breath sounds: Normal breath sounds  Chest:      Breasts:         Right: Normal  No inverted nipple, mass, nipple discharge, skin change or tenderness  Left: Normal  No inverted nipple, mass, nipple discharge, skin change or tenderness  Abdominal:      General: There is no distension  Palpations: Abdomen is soft  Tenderness: There is no abdominal tenderness  There is no guarding  Lymphadenopathy:      Upper Body:      Right upper body: No supraclavicular or axillary adenopathy  Left upper body: No supraclavicular or axillary adenopathy  Neurological:      General: No focal deficit present  Mental Status: She is alert and oriented to person, place, and time  Psychiatric:         Mood and Affect: Mood normal          Behavior: Behavior normal          Thought Content: Thought content normal          Judgment: Judgment normal    Vitals signs reviewed

## 2020-09-17 LAB
HPV HR 12 DNA CVX QL NAA+PROBE: NEGATIVE
HPV16 DNA CVX QL NAA+PROBE: NEGATIVE
HPV18 DNA CVX QL NAA+PROBE: NEGATIVE

## 2020-09-18 LAB
LAB AP GYN PRIMARY INTERPRETATION: NORMAL
Lab: NORMAL

## 2021-01-13 ENCOUNTER — DOCUMENTATION (OUTPATIENT)
Dept: OBGYN CLINIC | Facility: CLINIC | Age: 61
End: 2021-01-13

## 2021-01-13 DIAGNOSIS — Z12.31 ENCOUNTER FOR SCREENING MAMMOGRAM FOR MALIGNANT NEOPLASM OF BREAST: Primary | ICD-10-CM

## 2021-03-09 ENCOUNTER — HOSPITAL ENCOUNTER (OUTPATIENT)
Dept: RADIOLOGY | Facility: MEDICAL CENTER | Age: 61
Discharge: HOME | End: 2021-03-09
Payer: COMMERCIAL

## 2021-03-09 VITALS — HEIGHT: 67 IN | WEIGHT: 144 LBS | BODY MASS INDEX: 22.6 KG/M2

## 2021-03-09 DIAGNOSIS — Z12.31 ENCOUNTER FOR SCREENING MAMMOGRAM FOR MALIGNANT NEOPLASM OF BREAST: ICD-10-CM

## 2021-03-09 PROCEDURE — 77063 BREAST TOMOSYNTHESIS BI: CPT

## 2021-03-09 PROCEDURE — 77067 SCR MAMMO BI INCL CAD: CPT

## 2021-03-10 DIAGNOSIS — Z23 ENCOUNTER FOR IMMUNIZATION: ICD-10-CM

## 2021-05-25 ENCOUNTER — OFFICE VISIT (OUTPATIENT)
Dept: OBGYN CLINIC | Facility: CLINIC | Age: 61
End: 2021-05-25
Payer: COMMERCIAL

## 2021-05-25 VITALS — WEIGHT: 142.2 LBS | DIASTOLIC BLOOD PRESSURE: 78 MMHG | SYSTOLIC BLOOD PRESSURE: 122 MMHG | BODY MASS INDEX: 22.27 KG/M2

## 2021-05-25 DIAGNOSIS — N95.2 VAGINAL ATROPHY: Primary | ICD-10-CM

## 2021-05-25 DIAGNOSIS — M54.50 BILATERAL LOW BACK PAIN, UNSPECIFIED CHRONICITY, UNSPECIFIED WHETHER SCIATICA PRESENT: ICD-10-CM

## 2021-05-25 DIAGNOSIS — N90.89 VULVAR IRRITATION: ICD-10-CM

## 2021-05-25 LAB
BV WHIFF TEST VAG QL: ABNORMAL
CLUE CELLS SPEC QL WET PREP: ABNORMAL
LACTOBACILLUS (SPECIES) (HISTORICAL): ABNORMAL
PH SMN: ABNORMAL [PH]
SL AMB  POCT GLUCOSE, UA: NEGATIVE
SL AMB LEUKOCYTE ESTERASE,UA: NEGATIVE
SL AMB POCT BILIRUBIN,UA: NEGATIVE
SL AMB POCT BLOOD,UA: NEGATIVE
SL AMB POCT CLARITY,UA: CLEAR
SL AMB POCT COLOR,UA: YELLOW
SL AMB POCT KETONES,UA: NEGATIVE
SL AMB POCT NITRITE,UA: NEGATIVE
SL AMB POCT PH,UA: 6
SL AMB POCT SPECIFIC GRAVITY,UA: 1.01
SL AMB POCT URINE PROTEIN: NEGATIVE
SL AMB POCT UROBILINOGEN: NEGATIVE
SL AMB POCT WET MOUNT: ABNORMAL
T VAGINALIS VAG QL WET PREP: ABNORMAL
YEAST VAG QL WET PREP: ABNORMAL

## 2021-05-25 PROCEDURE — 81003 URINALYSIS AUTO W/O SCOPE: CPT | Performed by: NURSE PRACTITIONER

## 2021-05-25 PROCEDURE — 87210 SMEAR WET MOUNT SALINE/INK: CPT | Performed by: NURSE PRACTITIONER

## 2021-05-25 PROCEDURE — 99213 OFFICE O/P EST LOW 20 MIN: CPT | Performed by: NURSE PRACTITIONER

## 2021-05-25 RX ORDER — ESTRADIOL 0.1 MG/G
CREAM VAGINAL
Qty: 42.5 G | Refills: 1 | Status: SHIPPED | OUTPATIENT
Start: 2021-05-25

## 2021-05-25 NOTE — PATIENT INSTRUCTIONS
Normal urinalysis  No infection on wet mount  Consider daily probiotic  Epsom salt bathes  Coconut oil to external irritated vulvar skin  Call office with any worsening of symptoms  Start estrace cream as directed  Aware of benefits, risks and alternatives  Return to office for annual exam and vaginal atrophy follow up  Silicone based lubricant with sex

## 2021-05-25 NOTE — PROGRESS NOTES
Assessment/Plan:  Normal urinalysis  No infection on wet mount  Consider daily probiotic  Epsom salt bathes  Coconut oil to external irritated vulvar skin  Call office with any worsening of symptoms  Start estrace cream as directed  Aware of benefits, risks and alternatives  Return to office for annual exam and vaginal atrophy follow up  Silicone based lubricant with sex  Diagnoses and all orders for this visit:    Vaginal atrophy  -     estradiol (ESTRACE) 0 1 mg/g vaginal cream; Use 0 5 gm daily x 2 weeks then 0 5 gm twice weekly    Vulvar irritation  -     POCT wet mount    Bilateral low back pain, unspecified chronicity, unspecified whether sciatica present  -     POCT urine dip auto non-scope    Other orders  -     Cancel: POCT wet mount          Subjective:      Patient ID: Kayden Martinez is a 64 y o  female  Kayden Martinez is a 64 y o  female who is here today for a problem visit   C/o vaginal itching and burning and external irritation x  2 weeks  Used monistat supp x 3 days and external cream that was ineffective  Vagisil does lessen her external symptoms  No other alleviating or aggravating factors  Symptoms are possibly worse at night  No vaginal bleeding  LMP 3 years ago  No vaginal discharge noted  Denies dysuria but has a right lower back discomfort for which she requested a UA  No dysuria  Kayden Martinez is not sexually active with male partner of 31 years  Admits to dyspareunia so she discontinued having sex  Not acceptable to her   She retired last year  The following portions of the patient's history were reviewed and updated as appropriate: allergies, current medications, past family history, past medical history, past social history, past surgical history and problem list     Review of Systems   Constitutional: Negative  Gastrointestinal: Negative for abdominal pain, constipation, diarrhea, nausea and vomiting     Genitourinary: Positive for dyspareunia and vaginal pain  Negative for decreased urine volume, dysuria, genital sores, pelvic pain, urgency, vaginal bleeding and vaginal discharge  Vaginal irritation and itching   Musculoskeletal: Negative for arthralgias and myalgias  Skin: Negative  Hematological: Negative for adenopathy  Psychiatric/Behavioral: Negative  All other systems reviewed and are negative  Objective:      /78   Wt 64 5 kg (142 lb 3 2 oz)   LMP  (LMP Unknown)   BMI 22 27 kg/m²          Physical Exam  Vitals signs and nursing note reviewed  Constitutional:       Appearance: She is well-developed  Genitourinary:     Exam position: Lithotomy position  Labia:         Right: Tenderness present  No rash, lesion or injury  Left: Tenderness present  No rash, lesion or injury  Urethra: No prolapse, urethral pain, urethral swelling or urethral lesion  Vagina: No signs of injury and foreign body  Tenderness present  No vaginal discharge, erythema or bleeding  Cervix: Normal       Uterus: Normal        Adnexa: Right adnexa normal and left adnexa normal         Right: No mass, tenderness or fullness  Left: No mass, tenderness or fullness  Rectum: No external hemorrhoid  Comments: Vulvovaginal atrophy  Vulvar irritation and slight swelling/erythema of vestibule  Lymphadenopathy:      Lower Body: No right inguinal adenopathy  No left inguinal adenopathy  Skin:     General: Skin is warm and dry  Neurological:      Mental Status: She is alert and oriented to person, place, and time     Psychiatric:         Mood and Affect: Mood normal          Behavior: Behavior normal

## 2021-08-04 ENCOUNTER — TELEPHONE (OUTPATIENT)
Dept: OBGYN CLINIC | Facility: CLINIC | Age: 61
End: 2021-08-04

## 2021-08-04 DIAGNOSIS — Z12.31 ENCOUNTER FOR SCREENING MAMMOGRAM FOR MALIGNANT NEOPLASM OF BREAST: Primary | ICD-10-CM

## 2021-08-04 NOTE — TELEPHONE ENCOUNTER
Pt called to cancel her annual scheduled for 9/23  Pt states she retired and is having issues with her insurance, states she was told by her insurance, a gyn annual will not be covered  Pt also requests mammogram script as she cannot be seen in September  Mammo ordered placed

## 2021-10-26 ENCOUNTER — TELEPHONE (OUTPATIENT)
Dept: OBGYN CLINIC | Facility: CLINIC | Age: 61
End: 2021-10-26

## 2022-04-05 ENCOUNTER — HOSPITAL ENCOUNTER (OUTPATIENT)
Dept: RADIOLOGY | Age: 62
Discharge: HOME | End: 2022-04-05
Payer: COMMERCIAL

## 2022-04-05 VITALS — BODY MASS INDEX: 22.29 KG/M2 | HEIGHT: 67 IN | WEIGHT: 142 LBS

## 2022-04-05 DIAGNOSIS — Z12.31 ENCOUNTER FOR SCREENING MAMMOGRAM FOR MALIGNANT NEOPLASM OF BREAST: ICD-10-CM

## 2022-04-05 PROCEDURE — 77067 SCR MAMMO BI INCL CAD: CPT

## 2022-04-05 PROCEDURE — 77063 BREAST TOMOSYNTHESIS BI: CPT

## 2022-11-07 ENCOUNTER — TELEPHONE (OUTPATIENT)
Dept: GASTROENTEROLOGY | Facility: CLINIC | Age: 62
End: 2022-11-07

## 2022-11-07 NOTE — TELEPHONE ENCOUNTER
Patients GI provider:  Dr Abelino Ortega or Dr De La Garza Reach    Number to return call: ([phone])    Reason for call: Pt calling because she thinks she is due either this year or next year for her colonoscopy  No previous colonoscopy report or recall is in the chart  Can you please check and call patient as to when she is due  Thank you! If due, would like to schedule next year       Scheduled procedure/appointment date if applicable: Apt/procedure

## 2022-11-09 NOTE — TELEPHONE ENCOUNTER
Called and spoke to pt and informed that she is due 7/2024 and that she will receive a letter in the mail to call to schedule  Recall created

## 2023-04-06 ENCOUNTER — HOSPITAL ENCOUNTER (OUTPATIENT)
Dept: RADIOLOGY | Facility: MEDICAL CENTER | Age: 63
Discharge: HOME | End: 2023-04-06

## 2023-04-06 VITALS — HEIGHT: 67 IN | BODY MASS INDEX: 22.29 KG/M2 | WEIGHT: 142 LBS

## 2023-04-06 DIAGNOSIS — Z12.31 ENCOUNTER FOR SCREENING MAMMOGRAM FOR MALIGNANT NEOPLASM OF BREAST: ICD-10-CM

## 2023-04-24 ENCOUNTER — TELEPHONE (OUTPATIENT)
Dept: OBGYN CLINIC | Facility: CLINIC | Age: 63
End: 2023-04-24

## 2023-04-24 NOTE — TELEPHONE ENCOUNTER
Patient called, she states she has an apt with you next week for her yearly  She was adopted and recently found out that her birth mom passed away from ovarian cancer  She would like to know if she can do the screening test for this, or if we could order something for?

## 2023-05-02 ENCOUNTER — ANNUAL EXAM (OUTPATIENT)
Dept: OBGYN CLINIC | Facility: CLINIC | Age: 63
End: 2023-05-02

## 2023-05-02 VITALS
HEIGHT: 67 IN | BODY MASS INDEX: 21.97 KG/M2 | SYSTOLIC BLOOD PRESSURE: 128 MMHG | DIASTOLIC BLOOD PRESSURE: 76 MMHG | WEIGHT: 140 LBS

## 2023-05-02 DIAGNOSIS — Z80.41 FAMILY HISTORY OF OVARIAN CANCER: ICD-10-CM

## 2023-05-02 DIAGNOSIS — Z11.51 SCREENING FOR HPV (HUMAN PAPILLOMAVIRUS): ICD-10-CM

## 2023-05-02 DIAGNOSIS — Z12.31 ENCOUNTER FOR MAMMOGRAM TO ESTABLISH BASELINE MAMMOGRAM: ICD-10-CM

## 2023-05-02 DIAGNOSIS — Z01.419 WELL WOMAN EXAM WITH ROUTINE GYNECOLOGICAL EXAM: Primary | ICD-10-CM

## 2023-05-02 DIAGNOSIS — Z12.4 ENCOUNTER FOR SCREENING FOR MALIGNANT NEOPLASM OF CERVIX: ICD-10-CM

## 2023-05-02 PROBLEM — E78.2 MIXED HYPERLIPIDEMIA: Status: ACTIVE | Noted: 2021-07-20

## 2023-05-02 PROBLEM — E55.9 VITAMIN D DEFICIENCY: Status: ACTIVE | Noted: 2021-07-20

## 2023-05-02 PROBLEM — E03.9 ACQUIRED HYPOTHYROIDISM: Status: ACTIVE | Noted: 2021-07-20

## 2023-05-02 PROBLEM — Z01.411 ENCNTR FOR GYN EXAM (GENERAL) (ROUTINE) W ABNORMAL FINDINGS: Status: RESOLVED | Noted: 2018-08-13 | Resolved: 2023-05-02

## 2023-05-02 NOTE — PROGRESS NOTES
ASSESSMENT & PLAN:   Diagnoses and all orders for this visit:    Well woman exam with routine gynecological exam  -     Liquid-based pap, screening    Encounter for mammogram to establish baseline mammogram  -     Mammo screening bilateral w 3d & cad; Future    Encounter for screening for malignant neoplasm of cervix  -     Liquid-based pap, screening    Screening for HPV (human papillomavirus)  -     Liquid-based pap, screening    Family history of ovarian cancer  -     Ambulatory Referral to Oncology Genetics; Future          The following were reviewed in today's visit: ASCCP guidelines,  breast self exam, mammography screening ordered, use and side effects of HRT, menopause, exercise and healthy diet  Patient to return to office in yearly for annual exam      All questions have been answered to her satisfaction  CC:  Annual Gynecologic Examination  Chief Complaint   Patient presents with    Gynecologic Exam     Pt is here for an annual exam and pap smear  Last pap w/hpv 2020 neg/neg  3d mammogram 2023 wnl   dexa 2018       HPI: Rashid Cervantes is a 63 y o  [id number] who presents for annual gynecologic examination  She has the following concerns:  Birth mother  of ovarian cancer at age [de-identified]      Health Maintenance:    Exercise: frequently  Breast exams/breast awareness: yes  Diet: well balanced diet  Last mammogram:       Past Medical History:   Diagnosis Date    Allergic rhinitis     Anxiety     Asthma     Disease of thyroid gland     Ear problems     Hyperlipemia        Past Surgical History:   Procedure Laterality Date    WISDOM TOOTH EXTRACTION         Past OB/Gyn History:   No LMP recorded (lmp unknown)  Patient is postmenopausal     Menopausal status: postmenopausal  Menopausal symptoms: None    Last Pap:  : no abnormalities  History of abnormal Pap smear: no    Patient is currently sexually active     STD testing: no  Current contraception: none      Family History  Family History   Adopted: Yes   Problem Relation Age of Onset    No Known Problems Son     No Known Problems Son     Ovarian cancer Mother 79    No Known Problems Father     No Known Problems Maternal Grandmother     No Known Problems Maternal Grandfather     No Known Problems Paternal Grandmother     No Known Problems Paternal Grandfather     No Known Problems Half-Sister     No Known Problems Half-Sister     No Known Problems Half-Sister     No Known Problems Half-Brother        Family history of uterine or ovarian cancer: yes  Family history of breast cancer: no  Family history of colon cancer: no    Social History:  Social History     Socioeconomic History    Marital status: /Civil Union     Spouse name: Not on file    Number of children: 2    Years of education: Not on file    Highest education level: Not on file   Occupational History    Not on file   Tobacco Use    Smoking status: Never    Smokeless tobacco: Never   Vaping Use    Vaping Use: Never used   Substance and Sexual Activity    Alcohol use: Yes     Alcohol/week: 8 0 standard drinks     Types: 8 Glasses of wine per week     Comment: social    Drug use: No    Sexual activity: Yes     Partners: Male     Birth control/protection: None, Post-menopausal     Comment: x 29 years   Other Topics Concern    Not on file   Social History Narrative    Daily caffeine consumption, 2-3 servings    Exercises 3-4 times per week     Social Determinants of Health     Financial Resource Strain: Not on file   Food Insecurity: Not on file   Transportation Needs: Not on file   Physical Activity: Not on file   Stress: Not on file   Social Connections: Not on file   Intimate Partner Violence: Not on file   Housing Stability: Not on file     Domestic violence screen: negative    Allergies:   Allergies   Allergen Reactions    Eggs Or Egg-Derived Products - Food Allergy     Other      Seasonal    Paroxetine      TROUBLE SLEEPING     "Tetracycline        Medications:    Current Outpatient Medications:     Ascorbic Acid (VITAMIN C) 1000 MG tablet, Take 1,000 mg by mouth daily, Disp: , Rfl:     budesonide-formoterol (SYMBICORT) 160-4 5 mcg/act inhaler, Inhale 2 puffs 2 (two) times a day, Disp: , Rfl:     calcium carbonate (OS-CONCHITA) 600 MG tablet, Take 600 mg by mouth 2 (two) times a day with meals, Disp: , Rfl:     cetirizine (ZyrTEC) 10 mg tablet, Take 10 mg by mouth, Disp: , Rfl:     Cholecalciferol (VITAMIN D3) 2000 units capsule, Take 2,000 Units by mouth daily, Disp: , Rfl:     fluticasone (FLONASE) 50 mcg/act nasal spray, 2 sprays into each nostril daily, Disp: 16 g, Rfl: 6    levothyroxine 25 mcg tablet, Take 25 mcg by mouth daily, Disp: , Rfl:     Ventolin  (90 Base) MCG/ACT inhaler, inhale 2 puffs by mouth and INTO THE LUNGS every 6 hours if needed for wheezing, Disp: , Rfl:     omeprazole (PriLOSEC OTC) 20 MG tablet, Take 1 tablet (20 mg total) by mouth 2 (two) times a day 30-60 minutes prior to breakfast and dinner (Patient not taking: Reported on 5/2/2023), Disp: 60 tablet, Rfl: 3    Review of Systems:  Review of Systems   Constitutional: Negative  HENT: Negative  Respiratory: Negative  Cardiovascular: Negative  Gastrointestinal: Negative  Genitourinary: Negative  Neurological: Negative  Psychiatric/Behavioral: Negative  Physical Exam:  /76   Ht 5' 7\" (1 702 m)   Wt 63 5 kg (140 lb)   LMP  (LMP Unknown)   BMI 21 93 kg/m²    Physical Exam  Constitutional:       Appearance: Normal appearance  Genitourinary:      Bladder and urethral meatus normal       No lesions in the vagina  Right Labia: No rash, tenderness, lesions, skin changes or Bartholin's cyst      Left Labia: No tenderness, lesions, skin changes, Bartholin's cyst or rash  No vaginal erythema, tenderness or bleeding  Right Adnexa: not tender, not full and no mass present       Left Adnexa: not tender, not " full and no mass present  Cervix is parous  No cervical motion tenderness, discharge, lesion or polyp  Uterus is not enlarged, fixed or tender  No uterine mass detected  Urethral meatus caruncle not present  No urethral tenderness or mass present  Breasts:     Right: No swelling, bleeding, inverted nipple, mass, nipple discharge, skin change or tenderness  Left: No swelling, bleeding, inverted nipple, mass, nipple discharge, skin change or tenderness  HENT:      Head: Normocephalic and atraumatic  Eyes:      Extraocular Movements: Extraocular movements intact  Conjunctiva/sclera: Conjunctivae normal       Pupils: Pupils are equal, round, and reactive to light  Cardiovascular:      Rate and Rhythm: Normal rate and regular rhythm  Heart sounds: Normal heart sounds  No murmur heard  Pulmonary:      Effort: Pulmonary effort is normal  No respiratory distress  Breath sounds: Normal breath sounds  No wheezing or rales  Abdominal:      General: There is no distension  Palpations: Abdomen is soft  Tenderness: There is no abdominal tenderness  There is no guarding  Neurological:      General: No focal deficit present  Mental Status: She is alert and oriented to person, place, and time  Skin:     General: Skin is warm and dry  Psychiatric:         Mood and Affect: Mood normal          Behavior: Behavior normal    Vitals and nursing note reviewed

## 2023-05-03 ENCOUNTER — TELEPHONE (OUTPATIENT)
Dept: HEMATOLOGY ONCOLOGY | Facility: CLINIC | Age: 63
End: 2023-05-03

## 2023-05-03 NOTE — TELEPHONE ENCOUNTER
I called Bobette Libman to schedule a new patient appointment with the Cancer Risk and Genetics Program       Outcome:   I left a voice message encouraging the patient to call the [facility] at [phone] to schedule this appointment  A mychart message was also send with our contact information  Follow-up:   At this time the referral will be closed and we will wait to hear back from the patient regarding scheduling this appointment

## 2023-05-08 LAB
LAB AP GYN PRIMARY INTERPRETATION: NORMAL
Lab: NORMAL

## 2023-05-11 ENCOUNTER — TELEPHONE (OUTPATIENT)
Dept: GASTROENTEROLOGY | Facility: CLINIC | Age: 63
End: 2023-05-11

## 2023-05-11 NOTE — TELEPHONE ENCOUNTER
Ninom that I forwarded a records release for pt to fill out and email back to me  Once I receive I will mail records back to pt   If pt calls back please verify home address   Thanks Jennifer Pillai

## 2023-05-15 ENCOUNTER — TELEPHONE (OUTPATIENT)
Dept: HEMATOLOGY ONCOLOGY | Facility: CLINIC | Age: 63
End: 2023-05-15

## 2023-05-15 NOTE — TELEPHONE ENCOUNTER
I spoke with Alfonso Raman to schedule their consultation with Cancer Risk and Genetics  Scheduling Outcome: Patient is scheduled for an appointment on 10/31/23 at 9:30AM with Gregory Mack    Personal/Family History Related to Appointment:     Personal History of Cancer: Patient reports no personal history of cancer    Family History of Cancer: Patient reports family history of ovarian cancer (biological mom-patient is adopted)     Is patient of [address] [place] [address]?: No    History of Genetic Testing:  Personal History of Genetic Testing: Patient report no personal history of Genetic Testing  Family History of Genetic Testing: Patient reports that no family members have had Genetic Testing  Progeny:  Is patient able to complete our family history questionnaire on a computer?:  Yes    Patient's preferred e-mail address: [email or web address]

## 2023-05-26 ENCOUNTER — OFFICE VISIT (OUTPATIENT)
Dept: OBGYN CLINIC | Facility: CLINIC | Age: 63
End: 2023-05-26

## 2023-05-26 VITALS
SYSTOLIC BLOOD PRESSURE: 110 MMHG | HEIGHT: 67 IN | WEIGHT: 140 LBS | DIASTOLIC BLOOD PRESSURE: 62 MMHG | BODY MASS INDEX: 21.97 KG/M2

## 2023-05-26 DIAGNOSIS — M54.9 ACUTE BILATERAL BACK PAIN, UNSPECIFIED BACK LOCATION: ICD-10-CM

## 2023-05-26 DIAGNOSIS — R30.0 DYSURIA: ICD-10-CM

## 2023-05-26 DIAGNOSIS — R10.2 PELVIC PAIN: Primary | ICD-10-CM

## 2023-05-26 LAB
SL AMB  POCT GLUCOSE, UA: NORMAL
SL AMB LEUKOCYTE ESTERASE,UA: NORMAL
SL AMB POCT BILIRUBIN,UA: NORMAL
SL AMB POCT BLOOD,UA: NORMAL
SL AMB POCT CLARITY,UA: CLEAR
SL AMB POCT COLOR,UA: YELLOW
SL AMB POCT KETONES,UA: NORMAL
SL AMB POCT NITRITE,UA: NORMAL
SL AMB POCT PH,UA: 0.6
SL AMB POCT SPECIFIC GRAVITY,UA: 1.01
SL AMB POCT URINE PROTEIN: NORMAL
SL AMB POCT UROBILINOGEN: NORMAL

## 2023-05-26 RX ORDER — PANTOPRAZOLE SODIUM 40 MG/1
TABLET, DELAYED RELEASE ORAL
COMMUNITY
Start: 2023-05-11

## 2023-05-26 NOTE — PROGRESS NOTES
Assessment/Plan    Diagnoses and all orders for this visit:    Dysuria  -     POCT urine dip auto non-scope    Pelvic pain  -     US pelvis complete w transvaginal; Future    Acute bilateral back pain, unspecified back location  -     Ambulatory Referral to Physical Therapy; Future    Other orders  -     pantoprazole (PROTONIX) 40 mg tablet; TAKE 1 TABLET BY MOUTH ONCE DAILY BEFORE BREAKFAST (Patient not taking: Reported on 2023)        Discussed suspect musculoskeletal pain  Discussed stretches  PT if pain persists  Subjective  Chief Complaint   Patient presents with   • Pelvic Pain     Pt is here for pelvic pain and back pain  She has some pain after going to the bathroom but no burning during  Sample taken for urine dip in office  Mitul Amezcua is a 63 y o   female here for a problem visit  She is having discomfort in the lower pelvis and across her lower back  She is not having any urinary complaints  She is moving her bowels well, stool is soft  She denies n/v after eating  Some mild bloating  No bleeding  [place] and doing something [place] the farm, nothing sticks out as overly strenuous  Patient Active Problem List   Diagnosis   • Vulvar cyst   • Nevus of multiple sites   • Piriformis syndrome of right side   • Pain of right sacroiliac joint   • Vaginal atrophy   • Mild intermittent asthma without complication   • LORIE (stress urinary incontinence, female)   • Family history of ovarian cancer   • Acquired hypothyroidism   • Mixed hyperlipidemia   • Vitamin D deficiency         Gynecologic History  No LMP recorded (lmp unknown)   Patient is postmenopausal   Last Pap:      Obstetric History  OB History    Para Term  AB Living   2 2 2     2   SAB IAB Ectopic Multiple Live Births           2      # Outcome Date GA Lbr Abel/2nd Weight Sex Delivery Anes PTL Lv   2 Term 93    M Vag-Spont   [facility]   1 Term 10/19/91    M Vag-Spont   [facility] Obstetric Comments   30 yo and 35 yo          Past Medical History:   Diagnosis Date   • Allergic rhinitis    • Anxiety    • Asthma    • Disease of thyroid gland    • Ear problems    • Hyperlipemia        Past Surgical History:   Procedure Laterality Date   • WISDOM TOOTH EXTRACTION           Family History   Adopted: Yes   Problem Relation Age of Onset   • No Known Problems Son    • No Known Problems Son    • Ovarian cancer Mother 79   • No Known Problems Father    • No Known Problems Maternal Grandmother    • No Known Problems Maternal Grandfather    • No Known Problems Paternal Grandmother    • No Known Problems Paternal Grandfather    • No Known Problems Half-Sister    • No Known Problems Half-Sister    • No Known Problems Half-Sister    • No Known Problems Half-Brother        Social History     Socioeconomic History   • Marital status: /Civil Union     Spouse name: Not on file   • Number of children: 2   • Years of education: Not on file   • Highest education level: Not on file   Occupational History   • Not on file   Tobacco Use   • Smoking status: Never   • Smokeless tobacco: Never   Vaping Use   • Vaping Use: Never used   Substance and Sexual Activity   • Alcohol use:  Yes     Alcohol/week: 8 0 standard drinks of alcohol     Types: 8 Glasses of wine per week     Comment: social   • Drug use: No   • Sexual activity: Yes     Partners: Male     Birth control/protection: None, Post-menopausal     Comment: x 29 years   Other Topics Concern   • Not on file   Social History Narrative    Daily caffeine consumption, 2-3 servings    Exercises 3-4 times per week     Social Determinants of Health     Financial Resource Strain: Not on file   Food Insecurity: Not on file   Transportation Needs: Not on file   Physical Activity: Not on file   Stress: Not on file   Social Connections: Not on file   Intimate Partner Violence: Not on file   Housing Stability: Not on file       Allergies  Eggs or egg-derived products - "food allergy, Other, Paroxetine, and Tetracycline    Medications    Current Outpatient Medications:   •  Ascorbic Acid (VITAMIN C) 1000 MG tablet, Take 1,000 mg by mouth daily, Disp: , Rfl:   •  budesonide-formoterol (SYMBICORT) 160-4 5 mcg/act inhaler, Inhale 2 puffs 2 (two) times a day, Disp: , Rfl:   •  calcium carbonate (OS-CONCHITA) 600 MG tablet, Take 600 mg by mouth 2 (two) times a day with meals, Disp: , Rfl:   •  cetirizine (ZyrTEC) 10 mg tablet, Take 10 mg by mouth, Disp: , Rfl:   •  Cholecalciferol (VITAMIN D3) 2000 units capsule, Take 2,000 Units by mouth daily, Disp: , Rfl:   •  fluticasone (FLONASE) 50 mcg/act nasal spray, 2 sprays into each nostril daily, Disp: 16 g, Rfl: 6  •  levothyroxine 25 mcg tablet, Take 25 mcg by mouth daily, Disp: , Rfl:   •  Ventolin  (90 Base) MCG/ACT inhaler, inhale 2 puffs by mouth and INTO THE LUNGS every 6 hours if needed for wheezing, Disp: , Rfl:   •  omeprazole (PriLOSEC OTC) 20 MG tablet, Take 1 tablet (20 mg total) by mouth 2 (two) times a day 30-60 minutes prior to breakfast and dinner (Patient not taking: Reported on 5/2/2023), Disp: 60 tablet, Rfl: 3  •  pantoprazole (PROTONIX) 40 mg tablet, TAKE 1 TABLET BY MOUTH ONCE DAILY BEFORE BREAKFAST (Patient not taking: Reported on 5/26/2023), Disp: , Rfl:       Review of Systems  Review of Systems   Constitutional: Negative for activity change, appetite change, chills, fever and unexpected weight change  Gastrointestinal: Negative for constipation, nausea and vomiting  Genitourinary: Positive for pelvic pain  Negative for dysuria, flank pain, vaginal bleeding, vaginal discharge and vaginal pain  Musculoskeletal: Positive for back pain  Objective     /62   Ht 5' 7\" (1 702 m)   Wt 63 5 kg (140 lb)   LMP  (LMP Unknown)   BMI 21 93 kg/m²       Physical Exam  Constitutional:       General: She is not in acute distress  Appearance: Normal appearance  She is well-developed   She is not " ill-appearing  Genitourinary:      Vulva normal       Right Labia: No rash, tenderness or lesions  Left Labia: No tenderness, lesions or rash  No vaginal discharge  No vaginal prolapse present  Mild vaginal atrophy present  Right Adnexa: not tender, not full and no mass present  Left Adnexa: not tender, not full and no mass present  Cervix is parous  Uterus is not enlarged or tender  Pulmonary:      Effort: Pulmonary effort is normal  No respiratory distress  Abdominal:      General: Abdomen is flat  Neurological:      General: No focal deficit present  Mental Status: She is alert and oriented to person, place, and time  Psychiatric:         Mood and Affect: Mood normal          Behavior: Behavior normal          Thought Content: Thought content normal          Judgment: Judgment normal    Vitals and nursing note reviewed

## 2023-09-11 ENCOUNTER — HOSPITAL ENCOUNTER (OUTPATIENT)
Dept: RADIOLOGY | Facility: MEDICAL CENTER | Age: 63
Discharge: HOME | End: 2023-09-11

## 2023-09-11 DIAGNOSIS — M81.0 AGE-RELATED OSTEOPOROSIS WITHOUT CURRENT PATHOLOGICAL FRACTURE: ICD-10-CM

## 2023-09-12 ENCOUNTER — HOSPITAL ENCOUNTER (OUTPATIENT)
Dept: RADIOLOGY | Facility: IMAGING CENTER | Age: 63
Discharge: HOME | End: 2023-09-12
Payer: COMMERCIAL

## 2023-09-12 PROCEDURE — 77080 DXA BONE DENSITY AXIAL: CPT

## 2024-02-16 ENCOUNTER — DOCUMENTATION (OUTPATIENT)
Dept: GENETICS | Facility: CLINIC | Age: 64
End: 2024-02-16

## 2024-02-21 ENCOUNTER — TELEPHONE (OUTPATIENT)
Dept: GENETICS | Facility: CLINIC | Age: 64
End: 2024-02-21

## 2024-02-21 NOTE — TELEPHONE ENCOUNTER
Called and left a message to confirm upcoming virtual genetics appointment on 2/27/2024 at 9:00AM.

## 2024-02-22 ENCOUNTER — TELEPHONE (OUTPATIENT)
Dept: HEMATOLOGY ONCOLOGY | Facility: CLINIC | Age: 64
End: 2024-02-22

## 2024-02-22 NOTE — TELEPHONE ENCOUNTER
Patient Call    Who are you speaking with? Patient    If it is not the patient, are they listed on an active communication consent form? N/A   What is the reason for this call? She asked if she had to pay for genetis consult. I told her the consultation is free and they will let her know the cost of testing   Does this require a call back? N/A   If a call back is required, please list best call back number N/a   If a call back is required, advise that a message will be forwarded to their care team and someone will return their call as soon as possible.   Did you relay this information to the patient? N/A

## 2024-02-27 ENCOUNTER — CLINICAL SUPPORT (OUTPATIENT)
Dept: GENETICS | Facility: CLINIC | Age: 64
End: 2024-02-27

## 2024-02-27 ENCOUNTER — DOCUMENTATION (OUTPATIENT)
Dept: GENETICS | Facility: CLINIC | Age: 64
End: 2024-02-27

## 2024-02-27 DIAGNOSIS — Z80.41 FAMILY HISTORY OF OVARIAN CANCER: Primary | ICD-10-CM

## 2024-02-27 NOTE — LETTER
2024     Maggie Kaur MD   [facility] [id number]    Patient: Allie Vance  YOB: 1960  Date of Visit: 2024      Dear Dr. Kaur:    Thank you for referring Allie Vance to me for evaluation. Below are my notes for this consultation.    If you have questions, please do not hesitate to call me. I look forward to following your patient along with you.         Sincerely,        Kay Rao        CC: Sherman Laura MD        Pre-Test Genetic Counseling Consult Note    Patient Name: Allie Vance   /Age: 1960/63 y.o.  Referring Provider:  Maggie Kaur MD    Date of Service: 2024  Genetic Counselor: Kay Rao MS, [facility]  Interpretation Services: None  Location: Telephone consult   Length of Visit: 60 Minutes    Allie was referred to the [facility] Cancer Risk and Genetic Assessment Program due to her family history of ovarian cancer . she presents today to discuss the possibility of a hereditary cancer syndrome, options for genetic testing, and implications for her and her family.         Cancer History and Treatment:   Personal History:   Oncology History    No history exists.     Screening Hx:   Breast:  Breast Imagin23  Breast Density: Extremely dense    Colon:  Colonoscopy: 19  0 polyps reported    Gynecologic:  Ovaries and Uterus intact     Skin:  Sees derm annually    Other screening: none    Reproductive History  Age at menarche: 15  Age at first live birth:  31  Menopause: Post Menopausal (58)  Hormone replacement: none    Medical and Surgical History  Pertinent surgical history:   Past Surgical History:   Procedure Laterality Date   • WISDOM TOOTH EXTRACTION        Pertinent medical history:  Past Medical History:   Diagnosis Date   • Allergic rhinitis    • Anxiety    • Asthma    • Disease of thyroid gland    • Ear problems    • Hyperlipemia          Other History:  Height:   Ht Readings  "from Last 1 Encounters:   23 5' 7\" (1.702 m)     Weight:   Wt Readings from Last 1 Encounters:   23 63.5 kg (140 lb)       Relevant Family History   Allie was adopted and does not have information on her biological relatives aside from her 2 sons and birth mother. She is aware that her birth mother had a history of ovarian caner diagnosed at 70.    Please refer to the scanned pedigree in the Media Tab for a complete family history     *All history is reported as provided by the patient; records are not available for review, except where indicated.     Assessment:  We discussed sporadic, familial and hereditary cancer.  We also discussed the many factors that influence our risk for cancer such as age, environmental exposures, lifestyle choices and family history.      We reviewed the indications suggestive of a hereditary predisposition to cancer.    Allie is unaffected, but is considering genetic testing due to a family history of ovarian cancer. Although Allie's mother is the most informative person to undergo genetic testing, Allie can consider genetic testing due to the following:   Patient does not have cancer but is the most informative person to test because their mother is .    Genetic testing is indicated for Allie based on the following criteria: Meets NCCN V2.2024 Testing Criteria for Ovarian Cancer Susceptibility Genes: family history of a second-degree relative diagnosed with ovarian cacner    The risks, benefits, and limitations of genetic testing were reviewed with the patient, as well as genetic discrimination laws, and possible test results (positive, negative, variants of uncertain significance) and their clinical implications. If positive for a mutation, options for managing cancer risk including increased surveillance, chemoprevention, and in some cases prophylactic surgery were discussed. Allie was informed that if a hereditary cancer syndrome was " identified in her, first degree relatives (parents, siblings, and children) have a chance of also inheriting the condition. Genetic testing would allow for predictive genetic testing in other relatives, who may also be at risk depending on their degree of relation.     Billing:  Most individuals pay <$100 for hereditary cancer genetic testing. If insurance covers the cost of the testing, individuals may still pay out of pocket secondary to co-pays, co-insurance, or deductibles. If the cost of the testing exceeds $100, the lab will reach out to the patient via phone or e-mail. The patient will then have the option to proceed with the testing, cancel the testing, or elect the self-pay option of $250. Allie verbalized understanding.     Plan: Patient decided not to proceed with testing at this time.    Summary: Allie would like to further discuss testing with her family members and consider what genes she would like to test for. She expressed interest in breast and GYN-related genes and possibly genes that could help her sons. She will get in contact with our office when she would like to move forward with testing. I provided her with my office's contact information and will await her further decision.

## 2024-02-27 NOTE — PROGRESS NOTES
"Pre-Test Genetic Counseling Consult Note    Patient Name: Allie Vance   /Age: 1960/63 y.o.  Referring Provider:  Maggie Kaur MD    Date of Service: 2024  Genetic Counselor: Kay Rao MS, [facility]  Interpretation Services: None  Location: Telephone consult   Length of Visit: 60 Minutes    Allie was referred to the [facility] Cancer Risk and Genetic Assessment Program due to her family history of ovarian cancer . she presents today to discuss the possibility of a hereditary cancer syndrome, options for genetic testing, and implications for her and her family.         Cancer History and Treatment:   Personal History:   Oncology History    No history exists.     Screening Hx:   Breast:  Breast Imagin23  Breast Density: Extremely dense    Colon:  Colonoscopy: 19  0 polyps reported    Gynecologic:  Ovaries and Uterus intact     Skin:  Sees derm annually    Other screening: none    Reproductive History  Age at menarche: 15  Age at first live birth:  31  Menopause: Post Menopausal (58)  Hormone replacement: none    Medical and Surgical History  Pertinent surgical history:   Past Surgical History:   Procedure Laterality Date    WISDOM TOOTH EXTRACTION        Pertinent medical history:  Past Medical History:   Diagnosis Date    Allergic rhinitis     Anxiety     Asthma     Disease of thyroid gland     Ear problems     Hyperlipemia          Other History:  Height:   Ht Readings from Last 1 Encounters:   23 5' 7\" (1.702 m)     Weight:   Wt Readings from Last 1 Encounters:   23 63.5 kg (140 lb)       Relevant Family History   Allie was adopted and does not have information on her biological relatives aside from her 2 sons and birth mother. She is aware that her birth mother had a history of ovarian caner diagnosed at 70.    Please refer to the scanned pedigree in the Media Tab for a complete family history     *All history is reported as provided by the patient; " records are not available for review, except where indicated.     Assessment:  We discussed sporadic, familial and hereditary cancer.  We also discussed the many factors that influence our risk for cancer such as age, environmental exposures, lifestyle choices and family history.      We reviewed the indications suggestive of a hereditary predisposition to cancer.    Allie is unaffected, but is considering genetic testing due to a family history of ovarian cancer. Although Allie's mother is the most informative person to undergo genetic testing, Allie can consider genetic testing due to the following:   Patient does not have cancer but is the most informative person to test because their mother is .    Genetic testing is indicated for Allie based on the following criteria: Meets NCCN V2.2024 Testing Criteria for Ovarian Cancer Susceptibility Genes: family history of a second-degree relative diagnosed with ovarian cacner    The risks, benefits, and limitations of genetic testing were reviewed with the patient, as well as genetic discrimination laws, and possible test results (positive, negative, variants of uncertain significance) and their clinical implications. If positive for a mutation, options for managing cancer risk including increased surveillance, chemoprevention, and in some cases prophylactic surgery were discussed. Allie was informed that if a hereditary cancer syndrome was identified in her, first degree relatives (parents, siblings, and children) have a chance of also inheriting the condition. Genetic testing would allow for predictive genetic testing in other relatives, who may also be at risk depending on their degree of relation.     Billing:  Most individuals pay <$100 for hereditary cancer genetic testing. If insurance covers the cost of the testing, individuals may still pay out of pocket secondary to co-pays, co-insurance, or deductibles. If the cost of the testing  exceeds $100, the lab will reach out to the patient via phone or e-mail. The patient will then have the option to proceed with the testing, cancel the testing, or elect the self-pay option of $250. Allie verbalized understanding.     Plan: Patient decided not to proceed with testing at this time.    Summary: Allie would like to further discuss testing with her family members and consider what genes she would like to test for. She expressed interest in breast and GYN-related genes and possibly genes that could help her sons. She will get in contact with our office when she would like to move forward with testing. I provided her with my office's contact information and will await her further decision.

## 2024-03-06 ENCOUNTER — TELEPHONE (OUTPATIENT)
Age: 64
End: 2024-03-06

## 2024-03-06 NOTE — TELEPHONE ENCOUNTER
Patient called and had wanted to confirm that the Mammogram order in her chart was coded as a preventative screening mammogram. Patient had just wanted to confirm this to make sure her insurance would cover her mammogram. Patient can be reached at [phone].

## 2024-04-10 ENCOUNTER — HOSPITAL ENCOUNTER (OUTPATIENT)
Dept: RADIOLOGY | Facility: MEDICAL CENTER | Age: 64
Discharge: HOME | End: 2024-04-10
Payer: COMMERCIAL

## 2024-04-10 VITALS — WEIGHT: 140 LBS | BODY MASS INDEX: 21.97 KG/M2 | HEIGHT: 67 IN

## 2024-04-10 DIAGNOSIS — Z12.31 ENCOUNTER FOR SCREENING MAMMOGRAM FOR MALIGNANT NEOPLASM OF BREAST: ICD-10-CM

## 2024-04-10 DIAGNOSIS — Z12.31 ENCOUNTER FOR MAMMOGRAM TO ESTABLISH BASELINE MAMMOGRAM: ICD-10-CM

## 2024-04-10 PROCEDURE — 77067 SCR MAMMO BI INCL CAD: CPT

## 2024-04-10 PROCEDURE — 77063 BREAST TOMOSYNTHESIS BI: CPT

## 2024-05-01 ENCOUNTER — CONSULT (OUTPATIENT)
Dept: GASTROENTEROLOGY | Facility: AMBULARY SURGERY CENTER | Age: 64
End: 2024-05-01
Payer: COMMERCIAL

## 2024-05-01 ENCOUNTER — TELEPHONE (OUTPATIENT)
Dept: GASTROENTEROLOGY | Facility: AMBULARY SURGERY CENTER | Age: 64
End: 2024-05-01

## 2024-05-01 VITALS
WEIGHT: 140 LBS | OXYGEN SATURATION: 99 % | SYSTOLIC BLOOD PRESSURE: 118 MMHG | HEART RATE: 71 BPM | BODY MASS INDEX: 21.97 KG/M2 | DIASTOLIC BLOOD PRESSURE: 72 MMHG | HEIGHT: 67 IN

## 2024-05-01 DIAGNOSIS — Z86.010 HISTORY OF COLON POLYPS: Primary | ICD-10-CM

## 2024-05-01 DIAGNOSIS — K21.9 GASTROESOPHAGEAL REFLUX DISEASE WITHOUT ESOPHAGITIS: ICD-10-CM

## 2024-05-01 DIAGNOSIS — R13.19 ESOPHAGEAL DYSPHAGIA: ICD-10-CM

## 2024-05-01 PROCEDURE — 99243 OFF/OP CNSLTJ NEW/EST LOW 30: CPT | Performed by: INTERNAL MEDICINE

## 2024-05-01 RX ORDER — LEVOTHYROXINE SODIUM 0.05 MG/1
50 TABLET ORAL DAILY
COMMUNITY
Start: 2024-04-17

## 2024-05-01 RX ORDER — SODIUM, POTASSIUM,MAG SULFATES 17.5-3.13G
177 SOLUTION, RECONSTITUTED, ORAL ORAL ONCE
Qty: 177 ML | Refills: 0 | Status: SHIPPED | OUTPATIENT
Start: 2024-05-01 | End: 2024-05-01

## 2024-05-01 NOTE — PROGRESS NOTES
"[facility] - Outpatient Consultation  Allie Vance 63 y.o. female MRN: [id number]  Encounter: [id number]      PCP: Sherman Laura MD  Referring: No referring provider defined for this encounter.      ASSESSMENT AND PLAN:      1. History of colon polyps  Last colonoscopy 2019,  - Colonoscopy; Future  - Na Sulfate-K Sulfate-Mg Sulf (Suprep Bowel Prep Kit) 17.5-3.13-1.6 GM/177ML SOLN; Take 177 mL by mouth once for 1 dose Take 177 mL by mouth once for 1 dose  Dispense: 177 mL; Refill: 0    2. Gastroesophageal reflux disease without esophagitis  3. Esophageal dysphagia  Chronic symptoms > 12 months, now clinically stable/improved  - did require PPI therapy with symptoms of \"lump in throat\" / dysphagia  - continue antireflux measures  - EGD; Future, r/o erosive esophagitis, Schatzki's ring or other contributing to her symptoms      ______________________________________________________________________    CC:  Chief Complaint   Patient presents with    Colonoscopy     Patient seen previously at [place].   Here to get establlihed with Dr. Cosme and discuss colonoscopy and egd.       HPI:      Patient is a 63-year-old female referred for colon cancer screening, GERD.  She has acquired hypothyroidism, mild intermittent asthma, family history of ovarian cancer, vitamin D deficiency.  She reports history of 1 year of reflux symptoms, she was experiencing symptoms of lump in throat and mucus after eating most days of the week, she is taking Protonix 40 mg intermittently, she did take a 1 to 2-week course, with significant improvement in her symptoms.  She is no longer having significant symptoms, but does experience this occasionally.  She takes Advil approximately once a week.  She reports regular bowel movements without rectal bleeding.  She is adopted, and is not aware of her family history.    EGD, Colonoscopy 2019- small hiatal hernia; normal colonoscopy; recall 5 years; I personally " reviewed external reports.    REVIEW OF SYSTEMS:    CONSTITUTIONAL: Denies any fever, chills, rigors, and weight loss.  HEENT: No earache or tinnitus. Denies hearing loss or visual disturbances.  CARDIOVASCULAR: No chest pain or palpitations.   RESPIRATORY: Denies any cough, hemoptysis, shortness of breath or dyspnea on exertion.  GASTROINTESTINAL: As noted in the History of Present Illness.   GENITOURINARY: No problems with urination. Denies any hematuria or dysuria.  NEUROLOGIC: No dizziness or vertigo, denies headaches.   MUSCULOSKELETAL: Denies any muscle or joint pain.   SKIN: Denies skin rashes or itching.   ENDOCRINE: Denies excessive thirst. Denies intolerance to heat or cold.  PSYCHOSOCIAL: Denies depression or anxiety. Denies any recent memory loss.       Historical Information   Past Medical History:   Diagnosis Date    Allergic rhinitis     Anxiety     Asthma     Disease of thyroid gland     Ear problems     Hyperlipemia      Past Surgical History:   Procedure Laterality Date    WISDOM TOOTH EXTRACTION       Social History   Social History     Substance and Sexual Activity   Alcohol Use Yes    Alcohol/week: 8.0 standard drinks of alcohol    Types: 8 Glasses of wine per week    Comment: social     Social History     Substance and Sexual Activity   Drug Use No     Social History     Tobacco Use   Smoking Status Never   Smokeless Tobacco Never     Family History   Adopted: Yes   Problem Relation Age of Onset    No Known Problems Son     No Known Problems Son     Ovarian cancer Mother 70    No Known Problems Father     No Known Problems Maternal Grandmother     No Known Problems Maternal Grandfather     No Known Problems Paternal Grandmother     No Known Problems Paternal Grandfather     No Known Problems Half-Sister     No Known Problems Half-Sister     No Known Problems Half-Sister     No Known Problems Half-Brother        Meds/Allergies       Current Outpatient Medications:     Ascorbic Acid (VITAMIN C)  "1000 MG tablet    budesonide-formoterol (SYMBICORT) 160-4.5 mcg/act inhaler    calcium carbonate (OS-CONCHITA) 600 MG tablet    cetirizine (ZyrTEC) 10 mg tablet    Cholecalciferol (VITAMIN D3) 2000 units capsule    fluticasone (FLONASE) 50 mcg/act nasal spray    levothyroxine 25 mcg tablet    levothyroxine 50 mcg tablet    Ventolin  (90 Base) MCG/ACT inhaler    omeprazole (PriLOSEC OTC) 20 MG tablet    pantoprazole (PROTONIX) 40 mg tablet    Allergies   Allergen Reactions    Eggs Or Egg-Derived Products - Food Allergy     Other      Seasonal    Paroxetine      TROUBLE SLEEPING     Tetracycline            Objective     There were no vitals taken for this visit. There is no height or weight on file to calculate BMI.     PHYSICAL EXAM:      General Appearance:   Alert, cooperative, no distress   HEENT:   Normocephalic, atraumatic, anicteric.     Neck:  Supple, symmetrical, trachea midline   Lungs:   Clear to auscultation bilaterally; no rales, rhonchi or wheezing; respirations unlabored    Heart::   Regular rate and rhythm; no murmur, rub, or gallop.   Abdomen:   Soft, non-tender, non-distended; normal bowel sounds; no masses, no organomegaly    Genitalia:   Deferred    Rectal:   Deferred    Extremities:  No cyanosis, clubbing or edema    Pulses:  2+ and symmetric    Skin:  No jaundice, rashes, or lesions    Lymph nodes:  No palpable cervical lymphadenopathy        Lab Results:     No results found for: \"WBC\", \"HGB\", \"HCT\", \"MCV\", \"PLT\"    Lab Results   Component Value Date    K 5.0 06/15/2023     06/15/2023    CO2 26 06/15/2023    BUN 17 06/15/2023    CREATININE 1.01 06/15/2023    CALCIUM 9.2 06/15/2023    AST 19 06/15/2023    ALT 12 06/15/2023    ALKPHOS 59 06/15/2023    EGFR 63 06/15/2023       No results found for: \"INR\", \"PROTIME\"    I personally reviewed relevant labs    Radiology Results:     Portions of the record may have been created with voice recognition software. Occasional wrong word or \"sound a " "like\" substitutions may have occurred due to the inherent limitations of voice recognition software. Read the chart carefully and recognize, using context, where substitutions have occurred.        "

## 2024-05-01 NOTE — TELEPHONE ENCOUNTER
Scheduled date of EGD/colonoscopy (as of today):July 30, 2024  Physician performing EGD/colonoscopy: Dr. Cosme  Location of EGD/colonoscopy: [facility]  Desired bowel prep reviewed with patient: suprep  Instructions reviewed with patient by: Megan DANIEL   Clearances:  n/a

## 2024-06-19 ENCOUNTER — ANNUAL EXAM (OUTPATIENT)
Dept: OBGYN CLINIC | Facility: CLINIC | Age: 64
End: 2024-06-19
Payer: COMMERCIAL

## 2024-06-19 VITALS
WEIGHT: 141 LBS | HEIGHT: 67 IN | DIASTOLIC BLOOD PRESSURE: 76 MMHG | BODY MASS INDEX: 22.13 KG/M2 | SYSTOLIC BLOOD PRESSURE: 122 MMHG

## 2024-06-19 DIAGNOSIS — Z12.31 ENCOUNTER FOR SCREENING MAMMOGRAM FOR MALIGNANT NEOPLASM OF BREAST: ICD-10-CM

## 2024-06-19 DIAGNOSIS — Z01.419 WOMEN'S ANNUAL ROUTINE GYNECOLOGICAL EXAMINATION: Primary | ICD-10-CM

## 2024-06-19 PROCEDURE — S0612 ANNUAL GYNECOLOGICAL EXAMINA: HCPCS | Performed by: OBSTETRICS & GYNECOLOGY

## 2024-06-19 RX ORDER — AMOXICILLIN AND CLAVULANATE POTASSIUM 875; 125 MG/1; MG/1
TABLET, FILM COATED ORAL
COMMUNITY
Start: 2024-06-19

## 2024-06-19 RX ORDER — PREDNISONE 20 MG/1
TABLET ORAL
COMMUNITY
Start: 2024-06-19

## 2024-06-19 NOTE — PROGRESS NOTES
ASSESSMENT & PLAN:   Diagnoses and all orders for this visit:    Women's annual routine gynecological examination    Encounter for screening mammogram for malignant neoplasm of breast  -     Mammo screening bilateral w 3d & cad; Future    Other orders  -     predniSONE 20 mg tablet  -     amoxicillin-clavulanate (AUGMENTIN) 875-125 mg per tablet          The following were reviewed in today's visit: ASCCP guidelines, Gardisil vaccination, STD testing breast self exam, mammography screening ordered, menopause, and exercise.    Patient to return to office in yearly for annual exam.     All questions have been answered to her satisfaction.        CC:  Annual Gynecologic Examination  Chief Complaint   Patient presents with   • Gynecologic Exam     Pt is here for her yearly exam. Pap utd mammo ordered.  Neg pap/HPV 20, 23  mammo 4/10/24  DXA 23  Colonoscopy 19 - repeat 5 years       HPI: Allie Vance is a 63 y.o.  who presents for annual gynecologic examination.  She has the following concerns:  none.       Health Maintenance:    Exercise:  hasn't been on treadmill, [place]  Breast exams/breast awareness: yes  Last mammogram:   Colorectal cancer screenin, scheduled 24 for both colonoscopy and EGD    Past Medical History:   Diagnosis Date   • Allergic rhinitis    • Anxiety    • Asthma    • Disease of thyroid gland    • Ear problems    • Hyperlipemia        Past Surgical History:   Procedure Laterality Date   • WISDOM TOOTH EXTRACTION         Past OB/Gyn History:   No LMP recorded (lmp unknown). Patient is postmenopausal.    Menopausal status: postmenopausal  Menopausal symptoms: None    Last Pap:  : no abnormalities  History of abnormal Pap smear: no    Patient is not often sexually active.     Family History  Family History   Adopted: Yes   Problem Relation Age of Onset   • No Known Problems Son    • No Known Problems Son    • Ovarian cancer Mother 70    • No Known Problems Father    • No Known Problems Maternal Grandmother    • No Known Problems Maternal Grandfather    • No Known Problems Paternal Grandmother    • No Known Problems Paternal Grandfather    • No Known Problems Half-Sister    • No Known Problems Half-Sister    • No Known Problems Half-Sister    • No Known Problems Half-Brother        Family history of uterine or ovarian cancer: yes  Family history of breast cancer: no  Family history of colon cancer: no    Social History:  Social History     Socioeconomic History   • Marital status: /Civil Union     Spouse name: Not on file   • Number of children: 2   • Years of education: Not on file   • Highest education level: Not on file   Occupational History   • Not on file   Tobacco Use   • Smoking status: Never   • Smokeless tobacco: Never   Vaping Use   • Vaping status: Never Used   Substance and Sexual Activity   • Alcohol use: Yes     Alcohol/week: 8.0 standard drinks of alcohol     Types: 8 Glasses of wine per week     Comment: social   • Drug use: No   • Sexual activity: Yes     Partners: Male     Birth control/protection: None, Post-menopausal     Comment: x 29 years   Other Topics Concern   • Not on file   Social History Narrative    Daily caffeine consumption, 2-3 servings    Exercises 3-4 times per week     Social Determinants of Health     Financial Resource Strain: Not on file   Food Insecurity: Not on file   Transportation Needs: Not on file   Physical Activity: Not on file   Stress: Not on file   Social Connections: Not on file   Intimate Partner Violence: Not on file   Housing Stability: Not on file     Domestic violence screen: negative    Allergies:  Allergies   Allergen Reactions   • Eggs Or Egg-Derived Products - Food Allergy    • Other      Seasonal   • Paroxetine      TROUBLE SLEEPING    • Tetracycline        Medications:    Current Outpatient Medications:   •  amoxicillin-clavulanate (AUGMENTIN) 875-125 mg per tablet, , Disp: ,  Rfl:   •  Ascorbic Acid (VITAMIN C) 1000 MG tablet, Take 1,000 mg by mouth daily, Disp: , Rfl:   •  budesonide-formoterol (SYMBICORT) 160-4.5 mcg/act inhaler, Inhale 2 puffs 2 (two) times a day, Disp: , Rfl:   •  calcium carbonate (OS-CONCHITA) 600 MG tablet, Take 600 mg by mouth 2 (two) times a day with meals, Disp: , Rfl:   •  cetirizine (ZyrTEC) 10 mg tablet, Take 10 mg by mouth, Disp: , Rfl:   •  Cholecalciferol (VITAMIN D3) 2000 units capsule, Take 2,000 Units by mouth daily, Disp: , Rfl:   •  fluticasone (FLONASE) 50 mcg/act nasal spray, 2 sprays into each nostril daily, Disp: 16 g, Rfl: 6  •  levothyroxine 50 mcg tablet, Take 50 mcg by mouth daily, Disp: , Rfl:   •  predniSONE 20 mg tablet, , Disp: , Rfl:   •  Ventolin  (90 Base) MCG/ACT inhaler, inhale 2 puffs by mouth and INTO THE LUNGS every 6 hours if needed for wheezing, Disp: , Rfl:   •  levothyroxine 25 mcg tablet, Take 50 mcg by mouth daily (Patient not taking: Reported on 6/19/2024), Disp: , Rfl:   •  Na Sulfate-K Sulfate-Mg Sulf (Suprep Bowel Prep Kit) 17.5-3.13-1.6 GM/177ML SOLN, Take 177 mL by mouth once for 1 dose Take 177 mL by mouth once for 1 dose, Disp: 177 mL, Rfl: 0  •  omeprazole (PriLOSEC OTC) 20 MG tablet, Take 1 tablet (20 mg total) by mouth 2 (two) times a day 30-60 minutes prior to breakfast and dinner (Patient not taking: Reported on 5/2/2023), Disp: 60 tablet, Rfl: 3  •  pantoprazole (PROTONIX) 40 mg tablet, TAKE 1 TABLET BY MOUTH ONCE DAILY BEFORE BREAKFAST (Patient not taking: Reported on 5/26/2023), Disp: , Rfl:     Review of Systems:  Review of Systems   Constitutional:  Negative for chills and fever.   Respiratory:  Positive for wheezing (occasionally due to asthma). Negative for shortness of breath.    Cardiovascular:  Negative for chest pain.   Gastrointestinal:  Negative for abdominal distention, abdominal pain, blood in stool, constipation, nausea and vomiting.   Genitourinary:  Negative for difficulty urinating, dysuria,  "frequency, pelvic pain, urgency, vaginal bleeding, vaginal discharge and vaginal pain.         Physical Exam:  /76 (BP Location: Left arm, Patient Position: Sitting, Cuff Size: Standard)   Ht 5' 7\" (1.702 m)   Wt 64 kg (141 lb)   LMP  (LMP Unknown)   BMI 22.08 kg/m²    Physical Exam  Constitutional:       General: She is awake.      Appearance: Normal appearance. She is well-developed.   Genitourinary:      Vulva, bladder and urethral meatus normal.      Right Labia: No rash, tenderness or lesions.     Left Labia: No tenderness, lesions or rash.     No labial fusion noted.      No vaginal discharge, erythema, tenderness or bleeding.      No vaginal prolapse present.     No vaginal atrophy present.       Right Adnexa: not tender, not full and no mass present.     Left Adnexa: not tender, not full and no mass present.     Cervix is parous.      No cervical motion tenderness, discharge, lesion or polyp.      Uterus is not enlarged, tender or irregular.      No uterine mass detected.     No urethral prolapse present.      Bladder is not tender.       Pelvic exam was performed with patient in the lithotomy position.   Breasts:     Right: No inverted nipple, mass, nipple discharge, skin change or tenderness.      Left: No inverted nipple, mass, nipple discharge, skin change or tenderness.   HENT:      Head: Normocephalic and atraumatic.   Cardiovascular:      Rate and Rhythm: Normal rate and regular rhythm.      Heart sounds: Normal heart sounds.   Pulmonary:      Effort: Pulmonary effort is normal. No tachypnea or respiratory distress.      Breath sounds: Normal breath sounds.   Abdominal:      General: Abdomen is flat. There is no distension.      Palpations: Abdomen is soft.      Tenderness: There is no abdominal tenderness. There is no guarding or rebound.   Musculoskeletal:      Cervical back: Neck supple.   Lymphadenopathy:      Upper Body:      Right upper body: No supraclavicular or axillary adenopathy. "      Left upper body: No supraclavicular or axillary adenopathy.   Neurological:      General: No focal deficit present.      Mental Status: She is alert and oriented to person, place, and time.   Psychiatric:         Mood and Affect: Mood normal.         Behavior: Behavior normal.         Thought Content: Thought content normal.         Judgment: Judgment normal.   Vitals reviewed.

## 2024-07-09 ENCOUNTER — APPOINTMENT (OUTPATIENT)
Dept: LAB | Age: 64
End: 2024-07-09
Payer: COMMERCIAL

## 2024-07-09 DIAGNOSIS — E78.5 HYPERLIPIDEMIA, UNSPECIFIED HYPERLIPIDEMIA TYPE: ICD-10-CM

## 2024-07-09 DIAGNOSIS — T38.1X5A THYROID AGENT ADVERSE REACTION, INITIAL ENCOUNTER: ICD-10-CM

## 2024-07-09 LAB
ALBUMIN SERPL BCG-MCNC: 4.2 G/DL (ref 3.5–5)
ALP SERPL-CCNC: 71 U/L (ref 34–104)
ALT SERPL W P-5'-P-CCNC: 13 U/L (ref 7–52)
ANION GAP SERPL CALCULATED.3IONS-SCNC: 7 MMOL/L (ref 4–13)
AST SERPL W P-5'-P-CCNC: 17 U/L (ref 13–39)
BACTERIA UR QL AUTO: ABNORMAL /HPF
BASOPHILS # BLD AUTO: 0.06 THOUSANDS/ÂΜL (ref 0–0.1)
BASOPHILS NFR BLD AUTO: 1 % (ref 0–1)
BILIRUB SERPL-MCNC: 0.43 MG/DL (ref 0.2–1)
BILIRUB UR QL STRIP: NEGATIVE
BUN SERPL-MCNC: 17 MG/DL (ref 5–25)
CALCIUM SERPL-MCNC: 9.6 MG/DL (ref 8.4–10.2)
CHLORIDE SERPL-SCNC: 102 MMOL/L (ref 96–108)
CHOLEST SERPL-MCNC: 225 MG/DL
CLARITY UR: CLEAR
CO2 SERPL-SCNC: 30 MMOL/L (ref 21–32)
COLOR UR: ABNORMAL
CREAT SERPL-MCNC: 0.96 MG/DL (ref 0.6–1.3)
EOSINOPHIL # BLD AUTO: 0.4 THOUSAND/ÂΜL (ref 0–0.61)
EOSINOPHIL NFR BLD AUTO: 8 % (ref 0–6)
ERYTHROCYTE [DISTWIDTH] IN BLOOD BY AUTOMATED COUNT: 12.8 % (ref 11.6–15.1)
GFR SERPL CREATININE-BSD FRML MDRD: 64 ML/MIN/1.73SQ M
GLUCOSE P FAST SERPL-MCNC: 94 MG/DL (ref 65–99)
GLUCOSE UR STRIP-MCNC: NEGATIVE MG/DL
HCT VFR BLD AUTO: 43.8 % (ref 34.8–46.1)
HDLC SERPL-MCNC: 82 MG/DL
HGB BLD-MCNC: 14.5 G/DL (ref 11.5–15.4)
HGB UR QL STRIP.AUTO: NEGATIVE
IMM GRANULOCYTES # BLD AUTO: 0.01 THOUSAND/UL (ref 0–0.2)
IMM GRANULOCYTES NFR BLD AUTO: 0 % (ref 0–2)
KETONES UR STRIP-MCNC: NEGATIVE MG/DL
LDLC SERPL CALC-MCNC: 130 MG/DL (ref 0–100)
LEUKOCYTE ESTERASE UR QL STRIP: ABNORMAL
LYMPHOCYTES # BLD AUTO: 1.91 THOUSANDS/ÂΜL (ref 0.6–4.47)
LYMPHOCYTES NFR BLD AUTO: 39 % (ref 14–44)
MCH RBC QN AUTO: 29.7 PG (ref 26.8–34.3)
MCHC RBC AUTO-ENTMCNC: 33.1 G/DL (ref 31.4–37.4)
MCV RBC AUTO: 90 FL (ref 82–98)
MONOCYTES # BLD AUTO: 0.5 THOUSAND/ÂΜL (ref 0.17–1.22)
MONOCYTES NFR BLD AUTO: 10 % (ref 4–12)
NEUTROPHILS # BLD AUTO: 1.97 THOUSANDS/ÂΜL (ref 1.85–7.62)
NEUTS SEG NFR BLD AUTO: 42 % (ref 43–75)
NITRITE UR QL STRIP: NEGATIVE
NON-SQ EPI CELLS URNS QL MICRO: ABNORMAL /HPF
NONHDLC SERPL-MCNC: 143 MG/DL
NRBC BLD AUTO-RTO: 0 /100 WBCS
PH UR STRIP.AUTO: 7.5 [PH]
PLATELET # BLD AUTO: 255 THOUSANDS/UL (ref 149–390)
PMV BLD AUTO: 11.2 FL (ref 8.9–12.7)
POTASSIUM SERPL-SCNC: 4.8 MMOL/L (ref 3.5–5.3)
PROT SERPL-MCNC: 7 G/DL (ref 6.4–8.4)
PROT UR STRIP-MCNC: NEGATIVE MG/DL
RBC # BLD AUTO: 4.88 MILLION/UL (ref 3.81–5.12)
RBC #/AREA URNS AUTO: ABNORMAL /HPF
SODIUM SERPL-SCNC: 139 MMOL/L (ref 135–147)
SP GR UR STRIP.AUTO: 1.01 (ref 1–1.03)
T3 SERPL-MCNC: 1.2 NG/ML
TRIGL SERPL-MCNC: 67 MG/DL
TSH SERPL DL<=0.05 MIU/L-ACNC: 2.7 UIU/ML (ref 0.45–4.5)
UROBILINOGEN UR STRIP-ACNC: <2 MG/DL
WBC # BLD AUTO: 4.85 THOUSAND/UL (ref 4.31–10.16)
WBC #/AREA URNS AUTO: ABNORMAL /HPF

## 2024-07-09 PROCEDURE — 80061 LIPID PANEL: CPT

## 2024-07-09 PROCEDURE — 80053 COMPREHEN METABOLIC PANEL: CPT

## 2024-07-09 PROCEDURE — 81001 URINALYSIS AUTO W/SCOPE: CPT

## 2024-07-09 PROCEDURE — 85025 COMPLETE CBC W/AUTO DIFF WBC: CPT

## 2024-07-09 PROCEDURE — 84439 ASSAY OF FREE THYROXINE: CPT

## 2024-07-09 PROCEDURE — 84480 ASSAY TRIIODOTHYRONINE (T3): CPT

## 2024-07-09 PROCEDURE — 36415 COLL VENOUS BLD VENIPUNCTURE: CPT

## 2024-07-09 PROCEDURE — 84443 ASSAY THYROID STIM HORMONE: CPT

## 2024-07-10 LAB — T4 FREE SERPL-MCNC: 0.96 NG/DL (ref 0.61–1.12)

## 2024-07-25 ENCOUNTER — TELEPHONE (OUTPATIENT)
Age: 64
End: 2024-07-25

## 2024-07-25 NOTE — TELEPHONE ENCOUNTER
Patients GI provider:  Dr. Cosme    Number to return call: [phone]     Reason for call: Patient called and stated the colonoscopy she is scheduled for needs to be coded as preventive, advised the last colonoscopy she had done was in 2019 and she does have a hx of polyps, she told me that's its  incorrect and that we have to go by the one she had 10 years ago, again I told her that's its incorrect because her last colonoscopy was in 2019 and she had polyps removed so next colonoscopy has to be coded as diagnostic, she requested for me to send this for review from Dr Cosme and coding and requested a call back with a response please review thank you     Scheduled procedure/appointment date if applicable: 07/30/2024

## 2025-04-11 ENCOUNTER — HOSPITAL ENCOUNTER (OUTPATIENT)
Dept: RADIOLOGY | Facility: MEDICAL CENTER | Age: 65
Discharge: HOME | End: 2025-04-11
Payer: COMMERCIAL

## 2025-04-11 VITALS — BODY MASS INDEX: 22.13 KG/M2 | WEIGHT: 141 LBS | HEIGHT: 67 IN

## 2025-04-11 DIAGNOSIS — Z12.31 ENCOUNTER FOR SCREENING MAMMOGRAM FOR MALIGNANT NEOPLASM OF BREAST: ICD-10-CM

## 2025-04-11 PROCEDURE — 77067 SCR MAMMO BI INCL CAD: CPT

## 2025-04-11 PROCEDURE — 77063 BREAST TOMOSYNTHESIS BI: CPT

## 2025-04-19 ENCOUNTER — RESULTS FOLLOW-UP (OUTPATIENT)
Dept: OBGYN CLINIC | Facility: CLINIC | Age: 65
End: 2025-04-19

## 2025-04-30 ENCOUNTER — OFFICE VISIT (OUTPATIENT)
Dept: CARDIOLOGY CLINIC | Facility: CLINIC | Age: 65
End: 2025-04-30
Payer: COMMERCIAL

## 2025-04-30 VITALS
OXYGEN SATURATION: 97 % | BODY MASS INDEX: 22.43 KG/M2 | WEIGHT: 143.2 LBS | DIASTOLIC BLOOD PRESSURE: 88 MMHG | SYSTOLIC BLOOD PRESSURE: 128 MMHG | HEART RATE: 63 BPM

## 2025-04-30 DIAGNOSIS — R07.89 OTHER CHEST PAIN: Primary | ICD-10-CM

## 2025-04-30 DIAGNOSIS — E78.2 MIXED HYPERLIPIDEMIA: ICD-10-CM

## 2025-04-30 DIAGNOSIS — R06.09 DOE (DYSPNEA ON EXERTION): ICD-10-CM

## 2025-04-30 DIAGNOSIS — R94.31 ABNORMAL ECG: ICD-10-CM

## 2025-04-30 PROCEDURE — 99244 OFF/OP CNSLTJ NEW/EST MOD 40: CPT | Performed by: INTERNAL MEDICINE

## 2025-04-30 NOTE — PROGRESS NOTES
Allie Vance  1960  [id number]  [facility]  [address]  [address]  [facility] [address] [phone]    1. Other chest pain  Echo complete w/ contrast if indicated    Echo stress test, exercise    CT coronary calcium score      2. Mixed hyperlipidemia  POCT ECG    Echo complete w/ contrast if indicated    Echo stress test, exercise    CT coronary calcium score      3. GUERRERO (dyspnea on exertion)  Echo complete w/ contrast if indicated    Echo stress test, exercise    CT coronary calcium score      4. Abnormal ECG  Echo complete w/ contrast if indicated    Echo stress test, exercise    CT coronary calcium score          Discussion/Summary: Today's first visit with my patient.  She has complaints of atypical chest pain and dyspnea.  She has an abnormal ECG at baseline with nonspecific ST and T changes.  Recommend a stress echo to rule out ischemia.  Will also check a formal echo while she is here to evaluate her for right heart, pulmonary artery pressure, diastolic function.  In regards to ongoing cardiac risk a CT calcium score has been ordered.  Blood pressure is generally well-controlled she was slightly elevated in the office today we will continue to watch.  Further recommendations pending the results of the studies.    Interval History: Very pleasant 64-year-old female presents as a consult from Dr. Laura for chest pain and guerrero.  She feels as if her exercise tolerance has gone down.  Workload has been moderate with shortness of breath.  Occasionally gets the short chest tightness.  Can occur with and without exertion.  Denies any palpitations, lightheadedness, dizziness, or syncope.  Has been lower extreme edema, PND, orthopnea.  She is here today to talk about cardiac risk.    Medical Problems       Problem List       Vulvar cyst    Nevus of multiple sites    Piriformis syndrome of right side    Pain of right sacroiliac joint    Vaginal atrophy    Mild  intermittent asthma without complication    LORIE (stress urinary incontinence, female)    Family history of ovarian cancer    Overview Signed 9/14/2020  5:42 PM by Clarisa Luna DO   Biological mother, age 79yo. Survived 5 years with it.            Acquired hypothyroidism    Mixed hyperlipidemia    Vitamin D deficiency    Other chest pain    LAMB (dyspnea on exertion)        Past Medical History:   Diagnosis Date    Allergic rhinitis     Anxiety     Asthma     Disease of thyroid gland     Ear problems     Hyperlipemia      Social History     Socioeconomic History    Marital status: /Civil Union     Spouse name: Not on file    Number of children: 2    Years of education: Not on file    Highest education level: Not on file   Occupational History    Not on file   Tobacco Use    Smoking status: Never    Smokeless tobacco: Never   Vaping Use    Vaping status: Never Used   Substance and Sexual Activity    Alcohol use: Yes     Alcohol/week: 8.0 standard drinks of alcohol     Types: 8 Glasses of wine per week     Comment: social    Drug use: No    Sexual activity: Yes     Partners: Male     Birth control/protection: None, Post-menopausal     Comment: x 29 years   Other Topics Concern    Not on file   Social History Narrative    Daily caffeine consumption, 2-3 servings    Exercises 3-4 times per week     Social Drivers of Health     Financial Resource Strain: Not on file   Food Insecurity: Not on file   Transportation Needs: Not on file   Physical Activity: Not on file   Stress: Not on file   Social Connections: Not on file   Intimate Partner Violence: Not on file   Housing Stability: Not on file      Family History   Adopted: Yes   Problem Relation Age of Onset    No Known Problems Son     No Known Problems Son     Ovarian cancer Mother 70    No Known Problems Father     No Known Problems Maternal Grandmother     No Known Problems Maternal Grandfather     No Known Problems Paternal Grandmother     No Known Problems  Paternal Grandfather     No Known Problems Half-Sister     No Known Problems Half-Sister     No Known Problems Half-Sister     No Known Problems Half-Brother      Past Surgical History:   Procedure Laterality Date    WISDOM TOOTH EXTRACTION         Current Outpatient Medications:     Ascorbic Acid (VITAMIN C) 1000 MG tablet, Take 1,000 mg by mouth daily, Disp: , Rfl:     budesonide-formoterol (SYMBICORT) 160-4.5 mcg/act inhaler, Inhale 2 puffs 2 (two) times a day, Disp: , Rfl:     calcium carbonate (OS-CONCHITA) 600 MG tablet, Take 600 mg by mouth 2 (two) times a day with meals, Disp: , Rfl:     cetirizine (ZyrTEC) 10 mg tablet, Take 10 mg by mouth, Disp: , Rfl:     Cholecalciferol (VITAMIN D3) 2000 units capsule, Take 2,000 Units by mouth daily, Disp: , Rfl:     fluticasone (FLONASE) 50 mcg/act nasal spray, 2 sprays into each nostril daily, Disp: 16 g, Rfl: 6    levothyroxine 50 mcg tablet, Take 50 mcg by mouth daily, Disp: , Rfl:     Ventolin  (90 Base) MCG/ACT inhaler, inhale 2 puffs by mouth and INTO THE LUNGS every 6 hours if needed for wheezing, Disp: , Rfl:     amoxicillin-clavulanate (AUGMENTIN) 875-125 mg per tablet, , Disp: , Rfl:     levothyroxine 25 mcg tablet, Take 50 mcg by mouth daily (Patient not taking: Reported on 6/19/2024), Disp: , Rfl:     Na Sulfate-K Sulfate-Mg Sulf (Suprep Bowel Prep Kit) 17.5-3.13-1.6 GM/177ML SOLN, Take 177 mL by mouth once for 1 dose Take 177 mL by mouth once for 1 dose (Patient not taking: Reported on 4/30/2025), Disp: 177 mL, Rfl: 0    omeprazole (PriLOSEC OTC) 20 MG tablet, Take 1 tablet (20 mg total) by mouth 2 (two) times a day 30-60 minutes prior to breakfast and dinner (Patient not taking: Reported on 5/2/2023), Disp: 60 tablet, Rfl: 3    pantoprazole (PROTONIX) 40 mg tablet, TAKE 1 TABLET BY MOUTH ONCE DAILY BEFORE BREAKFAST (Patient not taking: Reported on 5/26/2023), Disp: , Rfl:     predniSONE 20 mg tablet, , Disp: , Rfl:   Allergies   Allergen Reactions     "Eggs Or Egg-Derived Products - Food Allergy     Other      Seasonal    Paroxetine      TROUBLE SLEEPING     Tetracycline        Labs:     Chemistry        Component Value Date/Time    K 4.8 07/09/2024 0737    K 5.0 06/15/2023 0745     07/09/2024 0737     06/15/2023 0745    CO2 30 07/09/2024 0737    CO2 26 06/15/2023 0745    BUN 17 07/09/2024 0737    BUN 17 06/15/2023 0745    CREATININE 0.96 07/09/2024 0737    CREATININE 1.01 06/15/2023 0745        Component Value Date/Time    CALCIUM 9.6 07/09/2024 0737    CALCIUM 9.2 06/15/2023 0745    ALKPHOS 71 07/09/2024 0737    ALKPHOS 59 06/15/2023 0745    AST 17 07/09/2024 0737    AST 19 06/15/2023 0745    ALT 13 07/09/2024 0737    ALT 12 06/15/2023 0745            No results found for: \"CHOL\"  Lab Results   Component Value Date    HDL 82 07/09/2024     Lab Results   Component Value Date    LDLCALC 130 (H) 07/09/2024     Lab Results   Component Value Date    TRIG 67 07/09/2024     No results found for: \"CHOLHDL\"    Imaging: Mammo screening bilateral w 3d & cad  Result Date: 4/17/2025  Narrative: DIAGNOSIS: Encounter for screening mammogram for malignant neoplasm of breast TECHNIQUE: Digital screening mammography was performed. Computer Aided Detection (CAD) analyzed all applicable images. COMPARISONS: Prior breast imaging dated: 04/10/2024, 04/06/2023, 04/05/2022, 03/09/2021, 10/28/2019, 10/24/2017, 10/21/2016, and 10/16/2015 RELEVANT HISTORY: Family Breast Cancer History: No known family history of breast cancer. Family Medical History: Family medical history includes ovarian cancer in mother. Personal History: Hormone history includes birth control. No known relevant surgical history. No known relevant medical history. The patient is scheduled in a reminder system for screening mammography. 8-10% of cancers will be missed on mammography. Management of a palpable abnormality must be based on clinical grounds.  Patients will be notified of their results via letter " from our facility. Accredited by [employer] and FDA. RISK ASSESSMENT: 5 Year Tyrer-Cuzick: 3.61% 10 Year Tyrer-Cuzick: 6.88% Lifetime Tyrer-Cuzick: 15.51% TISSUE DENSITY: The breasts are extremely dense, which lowers the sensitivity of mammography. INDICATION: Allie Vance is a 64 y.o. female presenting for screening mammography. FINDINGS: There are no suspicious masses, grouped microcalcifications or areas of architectural distortion. The skin and nipple areolar complex are unremarkable.     Impression: No mammographic evidence of malignancy. ASSESSMENT/BI-RADS CATEGORY: Left: 1 - Negative Right: 1 - Negative Overall: 1 - Negative RECOMMENDATION:      - Routine screening mammogram in 1 year for both breasts. Workstation ID: [id number] Signed by:  Becky Martinez MD       ECG:  nsr, nonspecific ST and T changes      Review of Systems   Constitutional: Negative.   HENT: Negative.     Eyes: Negative.    Cardiovascular:  Positive for chest pain and dyspnea on exertion.   Respiratory: Negative.     Endocrine: Negative.    Hematologic/Lymphatic: Negative.    Skin: Negative.    Musculoskeletal: Negative.    Gastrointestinal: Negative.    Genitourinary: Negative.    Neurological: Negative.    Psychiatric/Behavioral: Negative.     All other systems reviewed and are negative.      Vitals:    04/30/25 1101   BP: 128/88   Pulse: 63   SpO2: 97%     Vitals:    04/30/25 1101   Weight: 65 kg (143 lb 3.2 oz)         Body mass index is 22.43 kg/m².    Physical Exam:  Vital signs reviewed  General:  Alert and cooperative, appears stated age, no acute distress  HEENT:  PERRLA, EOMI, no scleral icterus, no conjunctival pallor  Neck:  No lymphadenopathy, no thyromegaly, no carotid bruits, no elevated JVP  Heart:  Regular rate and rhythm, normal S1/S2, no S3/S4, no murmur, rubs or gallops.  PMI nondisplaced  Lungs:  Clear to auscultation bilaterally, no wheezes rales or rhonchi  Abdomen:  Soft, non-tender,  positive bowel sounds, no rebound or guarding,   no organomegaly   Extremities:  Normal range of motion.  No clubbing, cyanosis or edema   Vascular:  2+ pedal pulses  Skin:  No rashes or lesions on exposed skin  Neurologic:  Cranial nerves II-XII grossly intact without focal deficits  Psych:  Normal mood and affect

## 2025-05-01 ENCOUNTER — HOSPITAL ENCOUNTER (OUTPATIENT)
Dept: CT IMAGING | Facility: HOSPITAL | Age: 65
Discharge: HOME | End: 2025-05-01
Attending: INTERNAL MEDICINE
Payer: COMMERCIAL

## 2025-05-01 DIAGNOSIS — R07.89 OTHER CHEST PAIN: ICD-10-CM

## 2025-05-01 DIAGNOSIS — R94.31 ABNORMAL ECG: ICD-10-CM

## 2025-05-01 DIAGNOSIS — E78.2 MIXED HYPERLIPIDEMIA: ICD-10-CM

## 2025-05-01 DIAGNOSIS — R06.09 DOE (DYSPNEA ON EXERTION): ICD-10-CM

## 2025-05-01 PROCEDURE — 75571 CT HRT W/O DYE W/CA TEST: CPT

## 2025-06-30 ENCOUNTER — ANNUAL EXAM (OUTPATIENT)
Dept: OBGYN CLINIC | Facility: CLINIC | Age: 65
End: 2025-06-30
Payer: COMMERCIAL

## 2025-06-30 ENCOUNTER — PREP FOR PROCEDURE (OUTPATIENT)
Dept: GASTROENTEROLOGY | Facility: AMBULARY SURGERY CENTER | Age: 65
End: 2025-06-30

## 2025-06-30 ENCOUNTER — TELEPHONE (OUTPATIENT)
Dept: GASTROENTEROLOGY | Facility: AMBULARY SURGERY CENTER | Age: 65
End: 2025-06-30

## 2025-06-30 VITALS
SYSTOLIC BLOOD PRESSURE: 120 MMHG | HEIGHT: 67 IN | WEIGHT: 140 LBS | DIASTOLIC BLOOD PRESSURE: 68 MMHG | BODY MASS INDEX: 21.97 KG/M2

## 2025-06-30 DIAGNOSIS — Z12.31 ENCOUNTER FOR SCREENING MAMMOGRAM FOR MALIGNANT NEOPLASM OF BREAST: ICD-10-CM

## 2025-06-30 DIAGNOSIS — Z01.419 WELL WOMAN EXAM WITH ROUTINE GYNECOLOGICAL EXAM: Primary | ICD-10-CM

## 2025-06-30 DIAGNOSIS — Z86.0100 HISTORY OF COLON POLYPS: Primary | ICD-10-CM

## 2025-06-30 PROCEDURE — S0612 ANNUAL GYNECOLOGICAL EXAMINA: HCPCS | Performed by: OBSTETRICS & GYNECOLOGY

## 2025-06-30 NOTE — TELEPHONE ENCOUNTER
06/30/25  Screened by: Megan Holcomb    Referring Provider Dr. Clifton     Pre- Screening:     There is no height or weight on file to calculate BMI.  Has patient been referred for a routine screening Colonoscopy? yes  Is the patient between 45-75 years old? yes      Previous Colonoscopy yes   If yes:    Date: [id number]     Facility: [facility]    Reason: history of colon polyps       SCHEDULING STAFF: If the patient is between 45yrs-49yrs, please advise patient to confirm benefits/coverage with their insurance company for a routine screening colonoscopy, some insurance carriers will only cover at 50yrs or older. If the patient is over 75years old, please schedule an office visit.     Does the patient want to see a Gastroenterologist prior to their procedure OR are they having any GI symptoms? no    Has the patient been hospitalized or had abdominal surgery in the past 6 months? no    Does the patient use supplemental oxygen? no    Does the patient take Coumadin, Lovenox, Plavix, Elliquis, Xarelto, or other blood thinning medication? no    Has the patient had a stroke, cardiac event, or stent placed in the past year? no    SCHEDULING STAFF: If patient answers NO to above questions, then schedule procedure. If patient answers YES to above questions, then schedule office appointment.     If patient is between 45yrs - 49yrs, please advise patient that we will have to confirm benefits & coverage with their insurance company for a routine screening colonoscopy.

## 2025-06-30 NOTE — PROGRESS NOTES
Annual Wellness Visit  Name: Allie Vance      : 1960      MRN: [id number]  Encounter Provider: Clarisa Luna DO  Encounter Date: 2025   Encounter department: [facility]    64 y.o.  yo presents today for her annual exam.:  Assessment & Plan  Well woman exam with routine gynecological exam    Orders:    Mammo screening bilateral w 3d and cad; Future    Encounter for screening mammogram for malignant neoplasm of breast    Orders:    Mammo screening bilateral w 3d and cad; Future        The following were reviewed in today's visit: ASCCP guidelines, breast self exam, mammography screening ordered, menopause, and exercise.    Patient to return to office in yearly for annual exam.     All questions have been answered to her satisfaction.        CC:  Annual Gynecologic Examination  Chief Complaint   Patient presents with    Gynecologic Exam     Pt is here for her yearly exam. Pap utd mammo ordered.  Neg pap/HPV 20, 23  mammo 25  DXA 23  Colonoscopy 19 - repeat 5 years         HPI: Allie Vance is a 64 y.o.  who presents for annual gynecologic examination.  She has the following concerns:  none      Health Maintenance:    Exercise: treadmill, swim, working outside  Breast exams/breast awareness: yes  Last mammogram: 2025  Colorectal cancer screenin - has appt with Carmelina - end of July     Past Medical History[1]    Past Surgical History[2]    Past OB/Gyn History:   No LMP recorded (lmp unknown). Patient is postmenopausal.    Menopausal status: postmenopausal  Menopausal symptoms: None    Last Pap:  : no abnormalities  History of abnormal Pap smear: no    Patient is really sexually active.     Family History  Family History[3]    Family history of uterine or ovarian cancer: yes  Family history of breast cancer: no  Family history of colon cancer: no    Social History:  Social History     Socioeconomic History     "Marital status: /Civil Union     Spouse name: Not on file    Number of children: 2    Years of education: Not on file    Highest education level: Not on file   Occupational History    Not on file   Tobacco Use    Smoking status: Never    Smokeless tobacco: Never   Vaping Use    Vaping status: Never Used   Substance and Sexual Activity    Alcohol use: Yes     Alcohol/week: 8.0 standard drinks of alcohol     Types: 8 Glasses of wine per week     Comment: social    Drug use: No    Sexual activity: Not Currently     Partners: Male     Birth control/protection: None, Post-menopausal     Comment: x 29 years   Other Topics Concern    Not on file   Social History Narrative    Daily caffeine consumption, 2-3 servings    Exercises 3-4 times per week     Social Drivers of Health     Financial Resource Strain: Not on file   Food Insecurity: Not on file   Transportation Needs: Not on file   Physical Activity: Not on file   Stress: Not on file   Social Connections: Not on file   Intimate Partner Violence: Not on file   Housing Stability: Not on file     Domestic violence screen: negative    Allergies:  Allergies[4]    Medications:  Current Medications[5]    Review of Systems:  Review of Systems   Constitutional:  Negative for chills and fever.   Respiratory:  Negative for shortness of breath.         H/o asthma      Cardiovascular:  Negative for chest pain.   Gastrointestinal:  Negative for abdominal distention, abdominal pain, blood in stool, constipation, nausea and vomiting.   Genitourinary:  Negative for difficulty urinating, dysuria, frequency, pelvic pain, urgency, vaginal bleeding, vaginal discharge and vaginal pain.   Neurological:  Negative for headaches.         Physical Exam:  /68 (BP Location: Right arm, Patient Position: Sitting, Cuff Size: Standard)   Ht 5' 7\" (1.702 m)   Wt 63.5 kg (140 lb)   LMP  (LMP Unknown)   BMI 21.93 kg/m²    Physical Exam  Constitutional:       General: She is awake.      " Appearance: Normal appearance. She is well-developed.   Genitourinary:      Vulva, bladder and urethral meatus normal.      Right Labia: No rash, tenderness or lesions.     Left Labia: No tenderness, lesions or rash.     No labial fusion noted.      No vaginal discharge, erythema, tenderness or bleeding.      No vaginal prolapse present.     Mild vaginal atrophy present.       Right Adnexa: not tender, not full and no mass present.     Left Adnexa: not tender, not full and no mass present.     Cervix is parous.      No cervical motion tenderness, discharge, lesion or polyp.      Uterus is not enlarged, tender or irregular.      No uterine mass detected.     No urethral prolapse present.      Bladder is not tender.       Pelvic exam was performed with patient in the lithotomy position.   Breasts:     Right: No inverted nipple, mass, nipple discharge, skin change or tenderness.      Left: No inverted nipple, mass, nipple discharge, skin change or tenderness.   HENT:      Head: Normocephalic and atraumatic.     Cardiovascular:      Rate and Rhythm: Normal rate and regular rhythm.      Heart sounds: Normal heart sounds.   Pulmonary:      Effort: Pulmonary effort is normal. No tachypnea or respiratory distress.      Breath sounds: Normal breath sounds.   Abdominal:      General: Abdomen is flat. There is no distension.      Palpations: Abdomen is soft.      Tenderness: There is no abdominal tenderness. There is no guarding or rebound.     Musculoskeletal:      Cervical back: Neck supple.   Lymphadenopathy:      Upper Body:      Right upper body: No supraclavicular or axillary adenopathy.      Left upper body: No supraclavicular or axillary adenopathy.     Neurological:      General: No focal deficit present.      Mental Status: She is alert and oriented to person, place, and time.     Psychiatric:         Mood and Affect: Mood normal.         Behavior: Behavior normal.         Thought Content: Thought content normal.          Judgment: Judgment normal.   Vitals reviewed.                                    [1]   Past Medical History:  Diagnosis Date    Allergic rhinitis     Anxiety     Asthma     Disease of thyroid gland     Ear problems     Hyperlipemia    [2]   Past Surgical History:  Procedure Laterality Date    WISDOM TOOTH EXTRACTION     [3]   Family History  Adopted: Yes   Problem Relation Name Age of Onset    No Known Problems Son      No Known Problems Son      Ovarian cancer Mother Mague 70    No Known Problems Father      No Known Problems Maternal Grandmother      No Known Problems Maternal Grandfather      No Known Problems Paternal Grandmother      No Known Problems Paternal Grandfather      No Known Problems Half-Sister      No Known Problems Half-Sister      No Known Problems Half-Sister      No Known Problems Half-Brother     [4]   Allergies  Allergen Reactions    Eggs Or Egg-Derived Products - Food Allergy     Other      Seasonal    Paroxetine      TROUBLE SLEEPING     Tetracycline    [5]   Current Outpatient Medications:     Ascorbic Acid (VITAMIN C) 1000 MG tablet, Take 1,000 mg by mouth in the morning., Disp: , Rfl:     budesonide-formoterol (SYMBICORT) 160-4.5 mcg/act inhaler, Inhale 2 puffs in the morning and 2 puffs in the evening., Disp: , Rfl:     calcium carbonate (OS-CONCHITA) 600 MG tablet, Take 600 mg by mouth in the morning and 600 mg in the evening. Take with meals., Disp: , Rfl:     cetirizine (ZyrTEC) 10 mg tablet, Take 10 mg by mouth, Disp: , Rfl:     Cholecalciferol (VITAMIN D3) 2000 units capsule, Take 2,000 Units by mouth in the morning., Disp: , Rfl:     fluticasone (FLONASE) 50 mcg/act nasal spray, 2 sprays into each nostril daily, Disp: 16 g, Rfl: 6    levothyroxine 50 mcg tablet, Take 50 mcg by mouth in the morning., Disp: , Rfl:     Ventolin  (90 Base) MCG/ACT inhaler, , Disp: , Rfl:     amoxicillin-clavulanate (AUGMENTIN) 875-125 mg per tablet, , Disp: , Rfl:     levothyroxine 25 mcg  tablet, Take 50 mcg by mouth daily (Patient not taking: Reported on 6/19/2024), Disp: , Rfl:     Na Sulfate-K Sulfate-Mg Sulf (Suprep Bowel Prep Kit) 17.5-3.13-1.6 GM/177ML SOLN, Take 177 mL by mouth once for 1 dose Take 177 mL by mouth once for 1 dose (Patient not taking: Reported on 4/30/2025), Disp: 177 mL, Rfl: 0    omeprazole (PriLOSEC OTC) 20 MG tablet, Take 1 tablet (20 mg total) by mouth 2 (two) times a day 30-60 minutes prior to breakfast and dinner (Patient not taking: Reported on 5/2/2023), Disp: 60 tablet, Rfl: 3    pantoprazole (PROTONIX) 40 mg tablet, TAKE 1 TABLET BY MOUTH ONCE DAILY BEFORE BREAKFAST (Patient not taking: Reported on 5/26/2023), Disp: , Rfl:     predniSONE 20 mg tablet, , Disp: , Rfl:

## 2025-07-09 ENCOUNTER — APPOINTMENT (OUTPATIENT)
Dept: LAB | Facility: MEDICAL CENTER | Age: 65
End: 2025-07-09
Attending: INTERNAL MEDICINE
Payer: COMMERCIAL

## 2025-07-09 DIAGNOSIS — R53.83 OTHER FATIGUE: ICD-10-CM

## 2025-07-09 DIAGNOSIS — E78.5 HYPERLIPIDEMIA, UNSPECIFIED HYPERLIPIDEMIA TYPE: ICD-10-CM

## 2025-07-09 DIAGNOSIS — R35.0 URINARY FREQUENCY: ICD-10-CM

## 2025-07-09 LAB
BACTERIA UR QL AUTO: ABNORMAL /HPF
BILIRUB UR QL STRIP: NEGATIVE
CLARITY UR: CLEAR
COLOR UR: ABNORMAL
GLUCOSE UR STRIP-MCNC: NEGATIVE MG/DL
HGB UR QL STRIP.AUTO: NEGATIVE
KETONES UR STRIP-MCNC: NEGATIVE MG/DL
LEUKOCYTE ESTERASE UR QL STRIP: ABNORMAL
NITRITE UR QL STRIP: NEGATIVE
NON-SQ EPI CELLS URNS QL MICRO: ABNORMAL /HPF
PH UR STRIP.AUTO: 7 [PH]
PROT UR STRIP-MCNC: NEGATIVE MG/DL
RBC #/AREA URNS AUTO: ABNORMAL /HPF
SP GR UR STRIP.AUTO: 1.02 (ref 1–1.03)
T3 SERPL-MCNC: 1.1 NG/ML (ref 0.9–1.8)
UROBILINOGEN UR STRIP-ACNC: <2 MG/DL
WBC #/AREA URNS AUTO: ABNORMAL /HPF

## 2025-07-09 PROCEDURE — 36415 COLL VENOUS BLD VENIPUNCTURE: CPT

## 2025-07-09 PROCEDURE — 81001 URINALYSIS AUTO W/SCOPE: CPT

## 2025-07-09 PROCEDURE — 84480 ASSAY TRIIODOTHYRONINE (T3): CPT

## 2025-07-22 ENCOUNTER — ANESTHESIA EVENT (OUTPATIENT)
Dept: ANESTHESIOLOGY | Facility: HOSPITAL | Age: 65
End: 2025-07-22

## 2025-07-22 ENCOUNTER — ANESTHESIA (OUTPATIENT)
Dept: ANESTHESIOLOGY | Facility: HOSPITAL | Age: 65
End: 2025-07-22

## 2025-07-25 ENCOUNTER — TELEPHONE (OUTPATIENT)
Age: 65
End: 2025-07-25

## 2025-07-25 NOTE — TELEPHONE ENCOUNTER
Called & spoke to pt to inform her of PA recommendations. She is aware and verbalized understanding. She asked that I reschedule her upcoming July appt with Dr. Cosme, as it was scheduled as a follow-up before the colonoscopy. She states she will get the echocardiogram done first, then call back to reschedule colonoscopy. She asked that we schedule her follow-up in January for first available with Dr. Cosme, in which I have scheduled.

## 2025-07-25 NOTE — TELEPHONE ENCOUNTER
Patient calling regarding phone call received.  States that she will need echo prior to colonoscopy d/t seeing cardiologist earlier in year.    Do see order for echo that was not done.  Patient states she does not feel that she needs one.  Has had no issues since that time after shoveling snow and believes pulled muscle.  Patient is not sure what to do at this time, if she needs or does not need echo.  Scheduled 8/5 for procedure.  Please advise.